# Patient Record
Sex: MALE | Race: WHITE | NOT HISPANIC OR LATINO | Employment: OTHER | URBAN - METROPOLITAN AREA
[De-identification: names, ages, dates, MRNs, and addresses within clinical notes are randomized per-mention and may not be internally consistent; named-entity substitution may affect disease eponyms.]

---

## 2017-07-21 PROBLEM — I20.9 ANGINA, CLASS II: Status: ACTIVE | Noted: 2017-07-21

## 2018-04-12 ENCOUNTER — HOSPITAL ENCOUNTER (INPATIENT)
Facility: HOSPITAL | Age: 83
LOS: 4 days | Discharge: NURSING, REHAB OR LONG-TERM CARE | DRG: 085 | End: 2018-04-17
Attending: EMERGENCY MEDICINE | Admitting: ANESTHESIOLOGY
Payer: MEDICARE

## 2018-04-12 DIAGNOSIS — I63.9 STROKE: ICD-10-CM

## 2018-04-12 DIAGNOSIS — I62.03 ACUTE ON CHRONIC INTRACRANIAL SUBDURAL HEMATOMA: ICD-10-CM

## 2018-04-12 DIAGNOSIS — R41.0 DISORIENTATION: ICD-10-CM

## 2018-04-12 DIAGNOSIS — S06.5XAA SDH (SUBDURAL HEMATOMA): ICD-10-CM

## 2018-04-12 DIAGNOSIS — I62.01 ACUTE ON CHRONIC INTRACRANIAL SUBDURAL HEMATOMA: ICD-10-CM

## 2018-04-12 DIAGNOSIS — I62.00 SUBDURAL HEMORRHAGE: Primary | ICD-10-CM

## 2018-04-12 DIAGNOSIS — R00.1 BRADYCARDIA: ICD-10-CM

## 2018-04-12 LAB
BASOPHILS # BLD AUTO: 0.03 K/UL
BASOPHILS NFR BLD: 0.4 %
DIFFERENTIAL METHOD: ABNORMAL
EOSINOPHIL # BLD AUTO: 0.3 K/UL
EOSINOPHIL NFR BLD: 4.6 %
ERYTHROCYTE [DISTWIDTH] IN BLOOD BY AUTOMATED COUNT: 13.8 %
HCT VFR BLD AUTO: 38.5 %
HGB BLD-MCNC: 13.2 G/DL
IMM GRANULOCYTES # BLD AUTO: 0.02 K/UL
IMM GRANULOCYTES NFR BLD AUTO: 0.3 %
LYMPHOCYTES # BLD AUTO: 1.5 K/UL
LYMPHOCYTES NFR BLD: 20.8 %
MCH RBC QN AUTO: 34.6 PG
MCHC RBC AUTO-ENTMCNC: 34.3 G/DL
MCV RBC AUTO: 101 FL
MONOCYTES # BLD AUTO: 0.6 K/UL
MONOCYTES NFR BLD: 8.3 %
NEUTROPHILS # BLD AUTO: 4.8 K/UL
NEUTROPHILS NFR BLD: 65.6 %
NRBC BLD-RTO: 0 /100 WBC
PLATELET # BLD AUTO: 130 K/UL
PMV BLD AUTO: 11.7 FL
RBC # BLD AUTO: 3.82 M/UL
WBC # BLD AUTO: 7.36 K/UL

## 2018-04-12 PROCEDURE — 99291 CRITICAL CARE FIRST HOUR: CPT | Mod: 25

## 2018-04-12 PROCEDURE — 80048 BASIC METABOLIC PNL TOTAL CA: CPT

## 2018-04-12 PROCEDURE — 99291 CRITICAL CARE FIRST HOUR: CPT | Mod: ,,, | Performed by: PHYSICIAN ASSISTANT

## 2018-04-12 PROCEDURE — 86850 RBC ANTIBODY SCREEN: CPT

## 2018-04-12 PROCEDURE — 85025 COMPLETE CBC W/AUTO DIFF WBC: CPT

## 2018-04-12 RX ORDER — NICARDIPINE HYDROCHLORIDE 0.2 MG/ML
5 INJECTION INTRAVENOUS CONTINUOUS
Status: DISCONTINUED | OUTPATIENT
Start: 2018-04-13 | End: 2018-04-13

## 2018-04-13 PROBLEM — I62.01 ACUTE ON CHRONIC INTRACRANIAL SUBDURAL HEMATOMA: Status: ACTIVE | Noted: 2018-04-13

## 2018-04-13 PROBLEM — R19.7 DIARRHEA OF PRESUMED INFECTIOUS ORIGIN: Status: ACTIVE | Noted: 2018-04-13

## 2018-04-13 PROBLEM — I10 HTN (HYPERTENSION): Status: ACTIVE | Noted: 2018-04-13

## 2018-04-13 PROBLEM — R90.89 MIDLINE SHIFT OF BRAIN: Status: ACTIVE | Noted: 2018-04-13

## 2018-04-13 PROBLEM — I62.03 ACUTE ON CHRONIC INTRACRANIAL SUBDURAL HEMATOMA: Status: ACTIVE | Noted: 2018-04-13

## 2018-04-13 PROBLEM — I60.9 SAH (SUBARACHNOID HEMORRHAGE): Status: ACTIVE | Noted: 2018-04-13

## 2018-04-13 PROBLEM — S06.5XAA SUBDURAL HEMATOMA: Status: ACTIVE | Noted: 2018-04-13

## 2018-04-13 PROBLEM — E63.9 NUTRITION DISORDER: Status: ACTIVE | Noted: 2018-04-13

## 2018-04-13 LAB
ABO + RH BLD: NORMAL
ALBUMIN SERPL BCP-MCNC: 2.9 G/DL
ALP SERPL-CCNC: 111 U/L
ALT SERPL W/O P-5'-P-CCNC: 16 U/L
AMPHET+METHAMPHET UR QL: NEGATIVE
ANION GAP SERPL CALC-SCNC: 10 MMOL/L
ANION GAP SERPL CALC-SCNC: 10 MMOL/L
ANION GAP SERPL CALC-SCNC: 11 MMOL/L
ANION GAP SERPL CALC-SCNC: 9 MMOL/L
AORTIC VALVE STENOSIS: ABNORMAL
APTT BLDCRRT: 26 SEC
AST SERPL-CCNC: 28 U/L
AST SERPL-CCNC: 30 U/L
AST SERPL-CCNC: 30 U/L
BARBITURATES UR QL SCN>200 NG/ML: NEGATIVE
BASOPHILS # BLD AUTO: 0.03 K/UL
BASOPHILS # BLD AUTO: 0.03 K/UL
BASOPHILS NFR BLD: 0.4 %
BASOPHILS NFR BLD: 0.4 %
BENZODIAZ UR QL SCN>200 NG/ML: NEGATIVE
BILIRUB SERPL-MCNC: 0.9 MG/DL
BILIRUB SERPL-MCNC: 0.9 MG/DL
BILIRUB SERPL-MCNC: 1 MG/DL
BILIRUB UR QL STRIP: NEGATIVE
BLD GP AB SCN CELLS X3 SERPL QL: NORMAL
BUN SERPL-MCNC: 12 MG/DL
BZE UR QL SCN: NEGATIVE
CALCIUM SERPL-MCNC: 8.3 MG/DL
CALCIUM SERPL-MCNC: 8.4 MG/DL
CALCIUM SERPL-MCNC: 8.5 MG/DL
CALCIUM SERPL-MCNC: 8.5 MG/DL
CANNABINOIDS UR QL SCN: NEGATIVE
CHLORIDE SERPL-SCNC: 107 MMOL/L
CHOLEST SERPL-MCNC: 121 MG/DL
CHOLEST/HDLC SERPL: 3 {RATIO}
CK MB SERPL-MCNC: 1.2 NG/ML
CK MB SERPL-RTO: 3.3 %
CK SERPL-CCNC: 36 U/L
CLARITY UR REFRACT.AUTO: CLEAR
CO2 SERPL-SCNC: 20 MMOL/L
CO2 SERPL-SCNC: 21 MMOL/L
CO2 SERPL-SCNC: 21 MMOL/L
CO2 SERPL-SCNC: 24 MMOL/L
COLOR UR AUTO: YELLOW
CREAT SERPL-MCNC: 0.8 MG/DL
CREAT SERPL-MCNC: 0.8 MG/DL
CREAT SERPL-MCNC: 0.9 MG/DL
CREAT SERPL-MCNC: 0.9 MG/DL
CREAT UR-MCNC: 22 MG/DL
DIASTOLIC DYSFUNCTION: YES
DIFFERENTIAL METHOD: ABNORMAL
DIFFERENTIAL METHOD: ABNORMAL
EOSINOPHIL # BLD AUTO: 0.3 K/UL
EOSINOPHIL # BLD AUTO: 0.3 K/UL
EOSINOPHIL NFR BLD: 4.2 %
EOSINOPHIL NFR BLD: 4.2 %
ERYTHROCYTE [DISTWIDTH] IN BLOOD BY AUTOMATED COUNT: 13.7 %
ERYTHROCYTE [DISTWIDTH] IN BLOOD BY AUTOMATED COUNT: 13.7 %
EST. GFR  (AFRICAN AMERICAN): >60 ML/MIN/1.73 M^2
EST. GFR  (NON AFRICAN AMERICAN): >60 ML/MIN/1.73 M^2
ESTIMATED AVG GLUCOSE: 91 MG/DL
ESTIMATED PA SYSTOLIC PRESSURE: 47.61
GLUCOSE SERPL-MCNC: 79 MG/DL
GLUCOSE SERPL-MCNC: 79 MG/DL
GLUCOSE SERPL-MCNC: 80 MG/DL
GLUCOSE SERPL-MCNC: 80 MG/DL
GLUCOSE UR QL STRIP: NEGATIVE
HBA1C MFR BLD HPLC: 4.8 %
HCT VFR BLD AUTO: 39.2 %
HCT VFR BLD AUTO: 39.2 %
HDLC SERPL-MCNC: 41 MG/DL
HDLC SERPL: 33.9 %
HGB BLD-MCNC: 13.4 G/DL
HGB BLD-MCNC: 13.4 G/DL
HGB UR QL STRIP: NEGATIVE
IMM GRANULOCYTES # BLD AUTO: 0.04 K/UL
IMM GRANULOCYTES # BLD AUTO: 0.04 K/UL
IMM GRANULOCYTES NFR BLD AUTO: 0.5 %
IMM GRANULOCYTES NFR BLD AUTO: 0.5 %
INR PPP: 1.3
INR PPP: 1.3
INR PPP: 1.4
KETONES UR QL STRIP: NEGATIVE
LDLC SERPL CALC-MCNC: 64 MG/DL
LEUKOCYTE ESTERASE UR QL STRIP: NEGATIVE
LYMPHOCYTES # BLD AUTO: 1.4 K/UL
LYMPHOCYTES # BLD AUTO: 1.4 K/UL
LYMPHOCYTES NFR BLD: 18.1 %
LYMPHOCYTES NFR BLD: 18.1 %
MAGNESIUM SERPL-MCNC: 1.9 MG/DL
MAGNESIUM SERPL-MCNC: 2 MG/DL
MAGNESIUM SERPL-MCNC: 2 MG/DL
MAGNESIUM SERPL-MCNC: 2.3 MG/DL
MCH RBC QN AUTO: 33.9 PG
MCH RBC QN AUTO: 33.9 PG
MCHC RBC AUTO-ENTMCNC: 34.2 G/DL
MCHC RBC AUTO-ENTMCNC: 34.2 G/DL
MCV RBC AUTO: 99 FL
MCV RBC AUTO: 99 FL
METHADONE UR QL SCN>300 NG/ML: NEGATIVE
MITRAL VALVE MOBILITY: NORMAL
MITRAL VALVE REGURGITATION: ABNORMAL
MONOCYTES # BLD AUTO: 0.6 K/UL
MONOCYTES # BLD AUTO: 0.6 K/UL
MONOCYTES NFR BLD: 7.4 %
MONOCYTES NFR BLD: 7.4 %
NEUTROPHILS # BLD AUTO: 5.5 K/UL
NEUTROPHILS # BLD AUTO: 5.5 K/UL
NEUTROPHILS NFR BLD: 69.4 %
NEUTROPHILS NFR BLD: 69.4 %
NITRITE UR QL STRIP: NEGATIVE
NONHDLC SERPL-MCNC: 80 MG/DL
NRBC BLD-RTO: 0 /100 WBC
NRBC BLD-RTO: 0 /100 WBC
OPIATES UR QL SCN: NEGATIVE
PCP UR QL SCN>25 NG/ML: NEGATIVE
PH UR STRIP: 6 [PH] (ref 5–8)
PHOSPHATE SERPL-MCNC: 2.4 MG/DL
PHOSPHATE SERPL-MCNC: 2.6 MG/DL
PHOSPHATE SERPL-MCNC: 2.6 MG/DL
PHOSPHATE SERPL-MCNC: 3.6 MG/DL
PLATELET # BLD AUTO: 136 K/UL
PLATELET # BLD AUTO: 136 K/UL
PMV BLD AUTO: 11.3 FL
PMV BLD AUTO: 11.3 FL
POCT GLUCOSE: 106 MG/DL (ref 70–110)
POCT GLUCOSE: 83 MG/DL (ref 70–110)
POCT GLUCOSE: 84 MG/DL (ref 70–110)
POCT GLUCOSE: 88 MG/DL (ref 70–110)
POCT GLUCOSE: 92 MG/DL (ref 70–110)
POTASSIUM SERPL-SCNC: 3.6 MMOL/L
POTASSIUM SERPL-SCNC: 3.7 MMOL/L
POTASSIUM SERPL-SCNC: 3.7 MMOL/L
POTASSIUM SERPL-SCNC: 4 MMOL/L
POTASSIUM SERPL-SCNC: 4.1 MMOL/L
PROT SERPL-MCNC: 6.3 G/DL
PROT SERPL-MCNC: 6.4 G/DL
PROT SERPL-MCNC: 6.4 G/DL
PROT UR QL STRIP: NEGATIVE
PROTHROMBIN TIME: 13.3 SEC
PROTHROMBIN TIME: 13.3 SEC
PROTHROMBIN TIME: 13.6 SEC
RBC # BLD AUTO: 3.95 M/UL
RBC # BLD AUTO: 3.95 M/UL
RETIRED EF AND QEF - SEE NOTES: 40 (ref 55–65)
SODIUM SERPL-SCNC: 138 MMOL/L
SODIUM SERPL-SCNC: 140 MMOL/L
SP GR UR STRIP: 1 (ref 1–1.03)
TOXICOLOGY INFORMATION: ABNORMAL
TRICUSPID VALVE REGURGITATION: ABNORMAL
TRIGL SERPL-MCNC: 80 MG/DL
TROPONIN I SERPL DL<=0.01 NG/ML-MCNC: 0.02 NG/ML
TSH SERPL DL<=0.005 MIU/L-ACNC: 2.39 UIU/ML
URN SPEC COLLECT METH UR: NORMAL
UROBILINOGEN UR STRIP-ACNC: NEGATIVE EU/DL
WBC # BLD AUTO: 7.95 K/UL
WBC # BLD AUTO: 7.95 K/UL

## 2018-04-13 PROCEDURE — 84484 ASSAY OF TROPONIN QUANT: CPT

## 2018-04-13 PROCEDURE — 93010 ELECTROCARDIOGRAM REPORT: CPT | Mod: ,,, | Performed by: INTERNAL MEDICINE

## 2018-04-13 PROCEDURE — 93005 ELECTROCARDIOGRAM TRACING: CPT | Mod: 25

## 2018-04-13 PROCEDURE — 85730 THROMBOPLASTIN TIME PARTIAL: CPT

## 2018-04-13 PROCEDURE — 92610 EVALUATE SWALLOWING FUNCTION: CPT

## 2018-04-13 PROCEDURE — 20000000 HC ICU ROOM

## 2018-04-13 PROCEDURE — 84132 ASSAY OF SERUM POTASSIUM: CPT

## 2018-04-13 PROCEDURE — 85610 PROTHROMBIN TIME: CPT | Mod: 91

## 2018-04-13 PROCEDURE — 82553 CREATINE MB FRACTION: CPT

## 2018-04-13 PROCEDURE — 80061 LIPID PANEL: CPT

## 2018-04-13 PROCEDURE — 83036 HEMOGLOBIN GLYCOSYLATED A1C: CPT

## 2018-04-13 PROCEDURE — 82550 ASSAY OF CK (CPK): CPT

## 2018-04-13 PROCEDURE — 80307 DRUG TEST PRSMV CHEM ANLYZR: CPT

## 2018-04-13 PROCEDURE — 25000003 PHARM REV CODE 250: Performed by: NURSE PRACTITIONER

## 2018-04-13 PROCEDURE — 99223 1ST HOSP IP/OBS HIGH 75: CPT | Mod: GC,,, | Performed by: NEUROLOGICAL SURGERY

## 2018-04-13 PROCEDURE — 80053 COMPREHEN METABOLIC PANEL: CPT

## 2018-04-13 PROCEDURE — 83735 ASSAY OF MAGNESIUM: CPT | Mod: 91

## 2018-04-13 PROCEDURE — 36415 COLL VENOUS BLD VENIPUNCTURE: CPT

## 2018-04-13 PROCEDURE — 81003 URINALYSIS AUTO W/O SCOPE: CPT

## 2018-04-13 PROCEDURE — 80053 COMPREHEN METABOLIC PANEL: CPT | Mod: 91

## 2018-04-13 PROCEDURE — 25000003 PHARM REV CODE 250: Performed by: PSYCHIATRY & NEUROLOGY

## 2018-04-13 PROCEDURE — 93306 TTE W/DOPPLER COMPLETE: CPT

## 2018-04-13 PROCEDURE — 27000221 HC OXYGEN, UP TO 24 HOURS

## 2018-04-13 PROCEDURE — 83735 ASSAY OF MAGNESIUM: CPT

## 2018-04-13 PROCEDURE — 85025 COMPLETE CBC W/AUTO DIFF WBC: CPT

## 2018-04-13 PROCEDURE — 85610 PROTHROMBIN TIME: CPT

## 2018-04-13 PROCEDURE — 84100 ASSAY OF PHOSPHORUS: CPT | Mod: 91

## 2018-04-13 PROCEDURE — 93306 TTE W/DOPPLER COMPLETE: CPT | Mod: 26,,, | Performed by: INTERNAL MEDICINE

## 2018-04-13 PROCEDURE — 99223 1ST HOSP IP/OBS HIGH 75: CPT | Mod: ,,, | Performed by: NURSE PRACTITIONER

## 2018-04-13 PROCEDURE — 82962 GLUCOSE BLOOD TEST: CPT

## 2018-04-13 PROCEDURE — 94761 N-INVAS EAR/PLS OXIMETRY MLT: CPT

## 2018-04-13 PROCEDURE — 97165 OT EVAL LOW COMPLEX 30 MIN: CPT

## 2018-04-13 PROCEDURE — 63600175 PHARM REV CODE 636 W HCPCS: Performed by: PSYCHIATRY & NEUROLOGY

## 2018-04-13 PROCEDURE — 84100 ASSAY OF PHOSPHORUS: CPT

## 2018-04-13 PROCEDURE — 84443 ASSAY THYROID STIM HORMONE: CPT

## 2018-04-13 PROCEDURE — 97162 PT EVAL MOD COMPLEX 30 MIN: CPT

## 2018-04-13 RX ORDER — SODIUM,POTASSIUM PHOSPHATES 280-250MG
2 POWDER IN PACKET (EA) ORAL
Status: DISCONTINUED | OUTPATIENT
Start: 2018-04-13 | End: 2018-04-17

## 2018-04-13 RX ORDER — LANOLIN ALCOHOL/MO/W.PET/CERES
800 CREAM (GRAM) TOPICAL
Status: DISCONTINUED | OUTPATIENT
Start: 2018-04-13 | End: 2018-04-17

## 2018-04-13 RX ORDER — PANTOPRAZOLE SODIUM 40 MG/1
40 TABLET, DELAYED RELEASE ORAL DAILY
Status: DISCONTINUED | OUTPATIENT
Start: 2018-04-13 | End: 2018-04-13

## 2018-04-13 RX ORDER — NITROGLYCERIN 0.4 MG/1
0.4 TABLET SUBLINGUAL EVERY 5 MIN PRN
Status: DISCONTINUED | OUTPATIENT
Start: 2018-04-13 | End: 2018-04-17 | Stop reason: HOSPADM

## 2018-04-13 RX ORDER — ACETAMINOPHEN 325 MG/1
650 TABLET ORAL EVERY 6 HOURS PRN
Status: DISCONTINUED | OUTPATIENT
Start: 2018-04-13 | End: 2018-04-17 | Stop reason: HOSPADM

## 2018-04-13 RX ORDER — LABETALOL HYDROCHLORIDE 5 MG/ML
10 INJECTION, SOLUTION INTRAVENOUS EVERY 4 HOURS PRN
Status: DISCONTINUED | OUTPATIENT
Start: 2018-04-13 | End: 2018-04-13

## 2018-04-13 RX ORDER — LABETALOL HYDROCHLORIDE 5 MG/ML
10 INJECTION, SOLUTION INTRAVENOUS EVERY 30 MIN PRN
Status: DISCONTINUED | OUTPATIENT
Start: 2018-04-13 | End: 2018-04-13

## 2018-04-13 RX ORDER — ATORVASTATIN CALCIUM 20 MG/1
20 TABLET, FILM COATED ORAL DAILY
Status: DISCONTINUED | OUTPATIENT
Start: 2018-04-13 | End: 2018-04-14

## 2018-04-13 RX ORDER — MAGNESIUM SULFATE HEPTAHYDRATE 40 MG/ML
2 INJECTION, SOLUTION INTRAVENOUS ONCE
Status: COMPLETED | OUTPATIENT
Start: 2018-04-13 | End: 2018-04-13

## 2018-04-13 RX ORDER — RANOLAZINE 500 MG/1
1000 TABLET, EXTENDED RELEASE ORAL 2 TIMES DAILY
Status: DISCONTINUED | OUTPATIENT
Start: 2018-04-13 | End: 2018-04-17 | Stop reason: HOSPADM

## 2018-04-13 RX ORDER — POTASSIUM CHLORIDE 20 MEQ/15ML
60 SOLUTION ORAL
Status: DISCONTINUED | OUTPATIENT
Start: 2018-04-13 | End: 2018-04-17

## 2018-04-13 RX ORDER — POTASSIUM CHLORIDE 20 MEQ/15ML
40 SOLUTION ORAL
Status: DISCONTINUED | OUTPATIENT
Start: 2018-04-13 | End: 2018-04-17

## 2018-04-13 RX ORDER — AZITHROMYCIN 250 MG/1
500 TABLET, FILM COATED ORAL DAILY
Status: COMPLETED | OUTPATIENT
Start: 2018-04-13 | End: 2018-04-15

## 2018-04-13 RX ORDER — ISOSORBIDE MONONITRATE 30 MG/1
60 TABLET, EXTENDED RELEASE ORAL DAILY
Status: DISCONTINUED | OUTPATIENT
Start: 2018-04-13 | End: 2018-04-14

## 2018-04-13 RX ORDER — LABETALOL HYDROCHLORIDE 5 MG/ML
10 INJECTION, SOLUTION INTRAVENOUS EVERY 4 HOURS PRN
Status: DISCONTINUED | OUTPATIENT
Start: 2018-04-13 | End: 2018-04-14

## 2018-04-13 RX ORDER — SODIUM CHLORIDE 9 MG/ML
INJECTION, SOLUTION INTRAVENOUS CONTINUOUS
Status: DISCONTINUED | OUTPATIENT
Start: 2018-04-13 | End: 2018-04-13

## 2018-04-13 RX ADMIN — SODIUM CHLORIDE: 0.9 INJECTION, SOLUTION INTRAVENOUS at 04:04

## 2018-04-13 RX ADMIN — POTASSIUM CHLORIDE 40 MEQ: 20 SOLUTION ORAL at 05:04

## 2018-04-13 RX ADMIN — ISOSORBIDE MONONITRATE 60 MG: 30 TABLET, EXTENDED RELEASE ORAL at 09:04

## 2018-04-13 RX ADMIN — RANOLAZINE 1000 MG: 1000 TABLET, FILM COATED, EXTENDED RELEASE ORAL at 10:04

## 2018-04-13 RX ADMIN — RANOLAZINE 1000 MG: 1000 TABLET, FILM COATED, EXTENDED RELEASE ORAL at 08:04

## 2018-04-13 RX ADMIN — PANTOPRAZOLE SODIUM 40 MG: 40 TABLET, DELAYED RELEASE ORAL at 09:04

## 2018-04-13 RX ADMIN — ATORVASTATIN CALCIUM 20 MG: 20 TABLET, FILM COATED ORAL at 09:04

## 2018-04-13 RX ADMIN — POTASSIUM & SODIUM PHOSPHATES POWDER PACK 280-160-250 MG 2 PACKET: 280-160-250 PACK at 05:04

## 2018-04-13 RX ADMIN — POTASSIUM & SODIUM PHOSPHATES POWDER PACK 280-160-250 MG 2 PACKET: 280-160-250 PACK at 09:04

## 2018-04-13 RX ADMIN — AZITHROMYCIN 500 MG: 250 TABLET, FILM COATED ORAL at 09:04

## 2018-04-13 RX ADMIN — MAGNESIUM SULFATE IN WATER 2 G: 40 INJECTION, SOLUTION INTRAVENOUS at 09:04

## 2018-04-13 RX ADMIN — PHYTONADIONE 10 MG: 10 INJECTION, EMULSION INTRAMUSCULAR; INTRAVENOUS; SUBCUTANEOUS at 09:04

## 2018-04-13 NOTE — ASSESSMENT & PLAN NOTE
85M with history of CAD, previously on ASA/Plavix which has been held, that presents with mild aphasia and confusion for past week s/p fall with small SDH 2 weeks ago. Repeat CT showing new SDH over prior chronic collection.     -No acute neurosurgical intervention  -recommend admission to [facility] for close monitoring and q1h neuro checks given recent worsening of SDH  -continue to hold antiplatelet medications  -SBP <160  -HOB30  -f/u repeat CT

## 2018-04-13 NOTE — PT/OT/SLP EVAL
Physical Therapy Evaluation    Patient Name:  Gurvinder Mckeon   MRN:  [id number]    Recommendations:     Discharge Recommendations:  home health PT (with 24/7 assistance ); pt and spouse would benefit from increased assistance/supervision at home 2/2 safety concerns   Discharge Equipment Recommendations: wheelchair   Barriers to discharge: Inaccessible home, decreased caregiver support     Assessment:     Gurvinder Mckeon is a 85 y.o. male admitted with a medical diagnosis of Acute on chronic intracranial subdural hematoma.  He presents with the following impairments/functional limitations:  weakness, impaired endurance, impaired functional mobilty, gait instability, impaired self care skills, impaired balance, decreased lower extremity function, decreased safety awareness, impaired cardiopulmonary response to activity, edema. Safety concerns noted for pt's home environment 2/2 pt's elderly wife serving as primary caretaker and unable to physical assist pt. Pt and spouse would benefit from additional assistance within home 2/2 fall risk, decreased safety awareness, and assistance with maintaining home; would appreciate CM recommendations on available resources. Pt would benefit from skilled PT intervention to address below listed deficits, reduce fall risk, and maximize (I) and safety with functional mobility.     Rehab Prognosis:  good; patient would benefit from acute skilled PT services to address these deficits and reach maximum level of function.      Recent Surgery: * No surgery found *      Plan:     During this hospitalization, patient to be seen 4 x/week to address the above listed problems via gait training, therapeutic activities, therapeutic exercises, neuromuscular re-education  · Plan of Care Expires:  05/13/18   Plan of Care Reviewed with: patient, spouse    Subjective     Communicated with RN prior to session.  Patient found supine with wife at bedside upon PT entry to room, agreeable to  evaluation.      Chief Complaint: decreased mobility   Patient comments/goals: return home   Pain/Comfort:  · Pain Rating 1: 0/10  · Pain Rating Post-Intervention 1: 0/10    Patients cultural, spiritual, Pentecostalism conflicts given the current situation: no known conflicts    Patient History:     Living Environment: Pt lives with wife in [facility] with 3 SUPRIYA, L HR available; bathroom has tub/shower.   Prior Level of Function: ambulating with 2 canes per wife, with supervision; performs ADLs with assistance from wife as needed   DME owned: rollator, RW, SPC, BSC   Caregiver Assistance: assistance from wife as needed (pt's wife is elderly, with concerns noted for safety awareness)     Objective:     Patient found with: telemetry, pulse ox (continuous), oxygen, peripheral IV, blood pressure cuff, SCD     General Precautions: Standard, aspiration, fall   Orthopedic Precautions:N/A   Braces: N/A     Exams:  · Cognitive Exam:  Patient is oriented to Person, Place and Situation and follows 100% of simple commands   · Postural Exam:  Patient presented with the following abnormalities:    · -       Rounded shoulders  · -       Forward head  · Skin Integrity/Edema:      · -       Skin integrity: Visible skin intact  · -       Edema: Moderate BLEs  · RLE ROM: WFL  · RLE Strength: WFL  · LLE ROM: WFL  · LLE Strength: WFL    Functional Mobility:       Bed Mobility  · Rolling to right: CGA   · Supine to sit: CGA with use of handrail         Transfers · Sit <> Stand: minimum assistance from EOB with no AD, HHA provided        Gait  Pt performed marching in place with bilateral HHA, followed by ~4 steps from EOB>chair with min-mod A (bilateral HHA), no AD.          AM-PAC 6 CLICK MOBILITY  Total Score:17       Therapeutic Activities and Exercises:  Pt and wife educated on:  - role of PT and POC/goals for therapy   - safety with mobility     Pt verbalized understanding. Pt expressed no further concerns/questions.       Patient left up in  chair with all lines intact, call button in reach, RN notified and wife present.    GOALS:    Physical Therapy Goals        Problem: Physical Therapy Goal    Goal Priority Disciplines Outcome Goal Variances Interventions   Physical Therapy Goal     PT/OT, PT Ongoing (interventions implemented as appropriate)     Description:  Goals to be met by: 18    Patient will increase functional independence with mobility by performin. Supine to sit with supervision   2. Sit to supine with supervision   3. Sit to stand transfer with Supervision with appropriate AD.   4. Gait  x 75 feet with Stand-by Assistance using Rolling Walker.   5. Ascend/descend 3 stairs with left Handrails Contact Guard Assistance.   6. Stand for 3 minutes with Stand-by Assistance using Rolling Walker  7. Lower extremity exercise program x20 reps per handout, with assistance as needed                      History:     Past Medical History:   Diagnosis Date    Anticoagulant long-term use     Anxiety     Arthritis     Bradycardia     CHF (congestive heart failure)     Coronary artery disease     Generalized headaches     Hemorrhoids     Hypercholesterolemia     Hypertension     Kidney stone     MI (myocardial infarction)     Prostate cancer     Skin cancer     UTI (urinary tract infection)     Weakness        Past Surgical History:   Procedure Laterality Date    CARDIAC CATHETERIZATION      WITH STENTS    CATARACT SX Bilateral     CHOLECYSTECTOMY      CORONARY STENT PLACEMENT      HEMORRHOID SURGERY      KNEE SX Right     FOR DAMAGED CARTILAGE       Clinical Decision Making:     Moderate complexity evaluation:   · Evolving clinical presentation   · 1-2 personal factors/comorbidities affecting treatment   · Examining and addressing 3+ functional deficits, activity limitations, and participation restrictions    Time Tracking:     PT Received On: 18  PT Start Time: 1022     PT Stop Time: 1046  PT Total Time (min): 24  min     Billable Minutes: Evaluation 24 min (Co-eval with OT)      Sasha Guan, PT, DPT   4/13/2018  Pager: [phone]

## 2018-04-13 NOTE — ASSESSMENT & PLAN NOTE
Contributing Nutrition Diagnosis  Inadequate energy intake    Related to (etiology):   Decreased ability to consume sufficient energy    Signs and Symptoms (as evidenced by):   Diet just advanced, no PO intake yet    Nutrition Diagnosis Status:   New

## 2018-04-13 NOTE — PLAN OF CARE
Problem: Patient Care Overview  Goal: Individualization & Mutuality  Dx: acute on chronic SDH (fall on 4/1 with R parietal vertex SDH, admitted 4/12 with additional L frontal parietal SDH)  Hx: MI s/p PCI, bradycardia, CAD, CHF, HTN, HLD    4/12: admitted to ED from [facility], CT head (additional SDH noted)  4/13: admitted to SICU     Nursing:  * q6h accuchecks  * SBP<160  * HOB 30-45 degrees   Outcome: Ongoing (interventions implemented as appropriate)  Remains oriented except needs reminder of year. All other neuro checks intact throughout shift.Blood pressure remains under 160 systolic. Continues with pvc's and artifact. Patient moves frequently in bed. 2l per nc. Patient using bedpan frequently with loose stools however beginning to form at this point. Urinal at bedside. Up to chair with PT/OT today. Tolerated cardiac diet. Denies pain. Patient remained free from falls. Care and plan discussed with patient and spouse. Iv fluids at 50ml/hr.

## 2018-04-13 NOTE — PT/OT/SLP EVAL
Speech Language Pathology Evaluation  Bedside Swallow    Patient Name:  Gurvinder Mckeon   MRN:  [id number]  Admitting Diagnosis: Acute on chronic intracranial subdural hematoma    Recommendations:                 General Recommendations:  Dysphagia therapy  Diet recommendations:  Regular, Thin   Aspiration Precautions: Standard aspiration precautions   General Precautions: Standard, aspiration  Communication strategies:pt Belk[place]ski    History:     Past Medical History:   Diagnosis Date    Anticoagulant long-term use     Anxiety     Arthritis     Bradycardia     CHF (congestive heart failure)     Coronary artery disease     Generalized headaches     Hemorrhoids     Hypercholesterolemia     Hypertension     Kidney stone     MI (myocardial infarction)     Prostate cancer     Skin cancer     UTI (urinary tract infection)     Weakness        Past Surgical History:   Procedure Laterality Date    CARDIAC CATHETERIZATION      WITH STENTS    CATARACT SX Bilateral     CHOLECYSTECTOMY      CORONARY STENT PLACEMENT      HEMORRHOID SURGERY      KNEE SX Right     FOR DAMAGED CARTILAGE       Social History: Patient lives with spouse.    Prior diet: Regular/thin.      Subjective     Awake/alert      Pain/Comfort:  · Pain Rating 1: 0/10  · Pain Rating Post-Intervention 1: 0/10    Objective:     Oral Musculature Evaluation  · Oral Musculature: WFL  · Mandibular Strength and Mobility: WFL  · Oral Labial Strength and Mobility: WFL  · Lingual Strength and Mobility: WFL  · Volitional Cough: adequate  · Volitional Swallow: timely   · Voice Prior to PO Intake: clear    Bedside Swallow Eval:   Consistencies Assessed:  · Thin liquids x8 cup/straw  · Puree x2  · Solids x1     Oral Phase:   · WFL    Pharyngeal Phase:   · timely swallow initiation  · no overt clinical signs/symptoms of aspiration    Compensatory Strategies  · None      Assessment:     Gurvinder Mckeon is a 85 y.o. male with an SLP diagnosis of  Dysphagia.   Goals:    SLP Goals        Problem: SLP Goal    Goal Priority Disciplines Outcome   SLP Goal     SLP    Description:  Speech Language Pathology Goals  Goals expected to be met by 4/19    1. Pt will tolerate regular diet/thin liquids without overt s/s of aspiration  2. Pt will participate in speech language cognitive eval                        Plan:     · Patient to be seen:  5 x/week   · Plan of Care reviewed with:  patient, spouse, daughter   · SLP Follow-Up:  Yes       Discharge recommendations:    TBD    Time Tracking:     SLP Treatment Date:   04/13/18  Speech Start Time:  0937  Speech Stop Time:  0946     Speech Total Time (min):  9 min    Billable Minutes: Eval Swallow and Oral Function 9    Mari Pool CCC-SLP  04/13/2018

## 2018-04-13 NOTE — ED TRIAGE NOTES
Pt states he fell during Easter and hit his head. Pt had a subdural hematoma. Pt had a follow up appointment today for his subdural hematoma and it was noted to have grown in size. Pt was transferred here from [facility]. Pt denies any symptoms except a pain posterior head, and pain in the back.

## 2018-04-13 NOTE — CONSULTS
"  [facility]  Adult Nutrition  Consult Note    SUMMARY     Recommendations  Recommendation/Intervention:   1. Encourage increased PO intake as tolerated.   2. If poor PO intake, recommend adding Boost Plus OS to all meals.   RD to monitor.    Goals: Patient to consume > 75% of meals  Nutrition Goal Status: new  Communication of RD Recs: reviewed with RN    Reason for Assessment  Reason for Assessment: consult  Diagnosis:  (acute on chronic intracranial SDH)  Relevant Medical History: CHF, CAD, HLD, HTN  General Information Comments: Patient transferred from [place]. Started on Cardiac diet this morning, no PO intake yet.  Nutrition Discharge Planning: Adequate nutrition via PO intake.    Nutrition/Diet History  Food Preferences: No cultural/Anabaptism needs identified at this time.  Factors Affecting Nutritional Intake:  (diet just advanced)    Anthropometrics  Temp: 97.6 °F (36.4 °C)  Height: 5' 6" (167.6 cm) (per chart review)  Height (inches): 66 in  Weight Method: Bed Scale  Weight: 81.1 kg (178 lb 12.7 oz)  Weight (lb): 178.79 lb  Ideal Body Weight (IBW), Male: 142 lb  % Ideal Body Weight, Male (lb): 125.91 lb  BMI (Calculated): 28.9  BMI Grade: 25 - 29.9 - overweight    Lab/Procedures/Meds  Pertinent Labs Reviewed: reviewed  Pertinent Labs Comments: Ca 8.4, Phos 2.4, Alb 2.9  Pertinent Medications Reviewed: reviewed  Pertinent Medications Comments: IVF    Physical Findings/Assessment  Overall Physical Appearance: on oxygen therapy  Oral/Mouth Cavity: WDL  Skin: edema    Estimated/Assessed Needs  Weight Used For Calorie Calculations: 81.1 kg (178 lb 12.7 oz)  Energy Calorie Requirements (kcal): 1799 kcal/day  Energy Need Method: [place] (x 1.25)  Protein Requirements: 81-98 g/day (1.0-1.2 g/kg)  Weight Used For Protein Calculations: 81.1 kg (178 lb 12.7 oz)  Fluid Requirements (mL): 1 mL/kcal or per MD  Fluid Need Method: RDA Method  RDA Method (mL): 1799    Nutrition Prescription " Ordered  Current Diet Order: Cardiac    Evaluation of Received Nutrient/Fluid Intake  I/O: Noted  Comments: LBM 4/13  % Intake of Estimated Energy Needs: 0 - 25 %  % Meal Intake: 0 - 25 % (diet just advanced)    Nutrition Risk  Level of Risk/Frequency of Follow-up: high (2x/week)     Assessment and Plan  * Acute on chronic intracranial subdural hematoma    Contributing Nutrition Diagnosis  Inadequate energy intake    Related to (etiology):   Decreased ability to consume sufficient energy    Signs and Symptoms (as evidenced by):   Diet just advanced, no PO intake yet    Nutrition Diagnosis Status:   New          Monitor and Evaluation  Food and Nutrient Intake: energy intake, food and beverage intake  Food and Nutrient Adminstration: diet order  Physical Activity and Function: nutrition-related ADLs and IADLs  Anthropometric Measurements: weight, weight change  Biochemical Data, Medical Tests and Procedures: electrolyte and renal panel, gastrointestinal profile, inflammatory profile  Nutrition-Focused Physical Findings: overall appearance     Nutrition Follow-Up  RD Follow-up?: Yes

## 2018-04-13 NOTE — PLAN OF CARE
Problem: Physical Therapy Goal  Goal: Physical Therapy Goal  Goals to be met by: 18    Patient will increase functional independence with mobility by performin. Supine to sit with supervision   2. Sit to supine with supervision   3. Sit to stand transfer with Supervision with appropriate AD.   4. Gait  x 75 feet with Stand-by Assistance using Rolling Walker.   5. Ascend/descend 3 stairs with left Handrails Contact Guard Assistance.   6. Stand for 3 minutes with Stand-by Assistance using Rolling Walker  7. Lower extremity exercise program x20 reps per handout, with assistance as needed    Outcome: Ongoing (interventions implemented as appropriate)  Pt chart reviewed and PT evaluation completed- see note for details. POC initiated.     Sasha Guan, PT, DPT   2018  Pager: [phone]

## 2018-04-13 NOTE — HPI
Mr. Mckeon is a 85 y.o. male with pmhx of TSDH (4/1/18), MI s/p PCI, Bradycardia, CAD, CHF, HTN, and HLD who presents to [facility] for a higher level of care for Acute on Chronic SDH. The patient initially presented to [facility] on 04/01/18 s/p fall and head injury. CT head on the same day revealed SDH in high right parietal vertex without mass effect. His plavix and ASA were discontinued. Today (04/12/18) he was sent for a repeat CT, by PCP, with a plan to restart plavix and ASA. The CT revealed new 2 cm left frontal parietal SDH with L to R mass effect of 7 mm. Pt denies new falls, change in LOC, vision changes, numbness/tingling, loss of strength, or gait changes.

## 2018-04-13 NOTE — ED NOTES
Per ANDREA Banuelos: OK to hold cardene, and start if pt's SBP >160. Pt SBP <160 at this time. Will continue to monitor.

## 2018-04-13 NOTE — HPI
Mr Mckeon is an 85M with history of CAD with stents, prior MI, CHF, HTN that presents as transfer for worsening chronic SDH. He had a fall 2 weeks ago and was found to have small L SDH that as stable on repeat scans at OSH. Today he went for CT to see if he could restart antiplatelet medications and was found to have new collection on top of prior chronic SDH/hygroma.

## 2018-04-13 NOTE — PLAN OF CARE
SW met with Pt and Pt wife at bedside. Discussed HH recs and provided list. Gave [facility] Brochure for PA support at home. They have a pa for this. Wife mentioned she will contact [facility] as well. They will review list and let SW know asap of choices.    SW advised RN of need for HH orders and choice if Pt dc over the weekend. On call case management can be contacted to set up the HH over the weekend.    Tonia Davis, HIGINIO  Neurocritical Care   [facility]  [id number]

## 2018-04-13 NOTE — NURSING TRANSFER
Nursing Transfer Note      4/13/2018     Transfer to SICU [id number] from ED    Transfer via stretcher    Transfer with no personal belongings present    Transported by MORENITA Ventura/ED    Medicines sent: n/a    Chart send with patient: no chart present    Notified: family at bedside, Vin with NCC notified    Patient reassessed at: 04/13/18 0345    Pt tolerated transfer well, VSS, in NAD. Placed on wall oxygen and telemetry monitoring.    Skin note: No breakdown noted to skin at this time.

## 2018-04-13 NOTE — H&P
[facility]  Neurocritical Care  History & Physical    Admit Date: 4/12/2018  Service Date: 04/13/2018  Length of Stay: 0    Subjective:     Chief Complaint: Acute on chronic intracranial subdural hematoma    History of Present Illness: Mr. Mckeon is a 85 y.o. male with pmhx of TSDH (4/1/18), MI s/p PCI, Bradycardia, CAD, CHF, HTN, and HLD who presents to [facility] for a higher level of care for Acute on Chronic SDH. The patient initially presented to [facility] on 04/01/18 s/p fall and head injury. CT head on the same day revealed SDH in high right parietal vertex without mass effect. His plavix and ASA were discontinued. Today (04/12/18) he was sent for a repeat CT, by PCP, with a plan to restart plavix and ASA. The CT revealed new 2 cm left frontal parietal SDH with L to R mass effect of 7 mm. Pt denies new falls, change in LOC, vision changes, numbness/tingling, loss of strength, or gait changes.               Past Medical History:   Diagnosis Date    Anticoagulant long-term use     Anxiety     Arthritis     Bradycardia     CHF (congestive heart failure)     Coronary artery disease     Generalized headaches     Hemorrhoids     Hypercholesterolemia     Hypertension     Kidney stone     MI (myocardial infarction)     Prostate cancer     Skin cancer     UTI (urinary tract infection)     Weakness      Past Surgical History:   Procedure Laterality Date    CARDIAC CATHETERIZATION      WITH STENTS    CATARACT SX Bilateral     CHOLECYSTECTOMY      CORONARY STENT PLACEMENT      HEMORRHOID SURGERY      KNEE SX Right     FOR DAMAGED CARTILAGE      No current facility-administered medications on file prior to encounter.      Current Outpatient Prescriptions on File Prior to Encounter   Medication Sig Dispense Refill    aspirin (ECOTRIN) 81 MG EC tablet Take 81 mg by mouth once daily.      atorvastatin (LIPITOR) 40 MG tablet Take 20 mg by mouth once daily.        clopidogrel (PLAVIX) 75 mg tablet Take 75 mg by mouth once daily.      esomeprazole magnesium (NEXIUM 24HR) 22.3 mg CpDR Take 22.3 mg by mouth once daily. OTC      furosemide (LASIX) 20 MG tablet Take 20 mg by mouth once daily at 6am.      isosorbide mononitrate (IMDUR) 60 MG 24 hr tablet Take 60 mg by mouth once daily.      lorazepam (ATIVAN) 0.5 MG tablet Take 0.5 mg by mouth 2 (two) times daily as needed.      nitroGLYCERIN (NITROSTAT) 0.4 MG SL tablet Place 0.4 mg under the tongue every 5 (five) minutes as needed.      ranolazine (RANEXA) 1,000 mg Tb12 Take 1,000 mg by mouth 2 (two) times daily.      sertraline (ZOLOFT) 25 MG tablet Take 50 mg by mouth once daily.         Allergies: Codeine and Cyclobenzaprine    Family History   Problem Relation Age of Onset    Heart disease Mother      Social History   Substance Use Topics    Smoking status: Never Smoker    Smokeless tobacco: Never Used    Alcohol use No     Review of Systems   Constitutional: Denies fevers or chills.  Pulmonary: Denies shortness of breath or cough.  Cardiology: Denies chest pain or palpitations.  GI: Denies abdominal pain or constipation.  Neurologic: Denies new weakness, headaches, or paresthesias.   Objective:     Vitals:    Temp: 98.2 °F (36.8 °C)  Pulse: 71  BP: (!) 145/70  MAP (mmHg): 106  Resp: 19  SpO2: 97 %  O2 Device (Oxygen Therapy): nasal cannula    Temp  Min: 98.2 °F (36.8 °C)  Max: 98.6 °F (37 °C)  Pulse  Min: 66  Max: 87  BP  Min: 109/64  Max: 167/99  MAP (mmHg)  Min: 82  Max: 117  Resp  Min: 13  Max: 23  SpO2  Min: 94 %  Max: 98 %    No intake/output data recorded.           Physical Exam  GA: Alert, comfortable, no acute distress.   HEENT: No scleral icterus or JVD. Decreased hearing  Pulmonary: Clear to auscultation A/L. No wheezing, crackles, or rhonchi.  Cardiac: RRR S1 & S2 w/o rubs/murmurs/gallops.   Abdominal: Bowel sounds present x 4. No appreciable hepatosplenomegaly.  Skin: No jaundice, rashes, or visible  lesions.  Neuro:  --GCS: E4 V4 M6  --Mental Status:  AAO x 2, following commands in all ext, weaker in the lower  --CN II-XII grossly intact.   --Pupils 3mm, PERRL.   --Corneal reflex, gag, cough intact.  --LUE strength: 5/5  --LLE strength: 4/5  --RUE strength: 5/5  --RLE strength: 4/5    Today I personally reviewed pertinent medications, lines/drains/airways, imaging, lab results, notably:        Assessment/Plan:     Neuro   * Acute on chronic intracranial subdural hematoma    -- Neurosurgery consulted  -- 04/13: CTH f/u reveals an acute on chronic subdural hematoma overlying the left cerebral convexity with stable right midline shift of 6 mm and mild right subfalcine herniation. Stable small area of right parietal subarachnoid hemorrhage. Probable remote infarcts in the left corona radiata and left basal ganglia  -- SBP goal < 160  -- PT/OT/Speech  -- Continue Neuro checks q 1hr  -- CXR pending        SAH (subarachnoid hemorrhage)    -- See Acute on chronic intracranial subdural hematoma        Midline shift of brain    -- See Acute on chronic intracranial subdural hematoma        Subdural hematoma    -- See Acute on chronic intracranial subdural hematoma        Cardiac/Vascular   HTN (hypertension)    -- Continue to monitor HR and BP   -- SBP goal < 160  -- 2D echo pending  -- Labetalol Prn        Endocrine   Nutrition disorder    -- Speech and nutrition consulted             Prophylaxis:  Venous Thromboembolism: mechanical  Stress Ulcer: None  Ventilator Pneumonia: no     Activity Orders          None        Full Code    Zion Phillips NP  Neurocritical Care  [facility]

## 2018-04-13 NOTE — SUBJECTIVE & OBJECTIVE
Past Medical History:   Diagnosis Date    Anticoagulant long-term use     Anxiety     Arthritis     Bradycardia     CHF (congestive heart failure)     Coronary artery disease     Generalized headaches     Hemorrhoids     Hypercholesterolemia     Hypertension     Kidney stone     MI (myocardial infarction)     Prostate cancer     Skin cancer     UTI (urinary tract infection)     Weakness      Past Surgical History:   Procedure Laterality Date    CARDIAC CATHETERIZATION      WITH STENTS    CATARACT SX Bilateral     CHOLECYSTECTOMY      CORONARY STENT PLACEMENT      HEMORRHOID SURGERY      KNEE SX Right     FOR DAMAGED CARTILAGE      No current facility-administered medications on file prior to encounter.      Current Outpatient Prescriptions on File Prior to Encounter   Medication Sig Dispense Refill    aspirin (ECOTRIN) 81 MG EC tablet Take 81 mg by mouth once daily.      atorvastatin (LIPITOR) 40 MG tablet Take 20 mg by mouth once daily.       clopidogrel (PLAVIX) 75 mg tablet Take 75 mg by mouth once daily.      esomeprazole magnesium (NEXIUM 24HR) 22.3 mg CpDR Take 22.3 mg by mouth once daily. OTC      furosemide (LASIX) 20 MG tablet Take 20 mg by mouth once daily at 6am.      isosorbide mononitrate (IMDUR) 60 MG 24 hr tablet Take 60 mg by mouth once daily.      lorazepam (ATIVAN) 0.5 MG tablet Take 0.5 mg by mouth 2 (two) times daily as needed.      nitroGLYCERIN (NITROSTAT) 0.4 MG SL tablet Place 0.4 mg under the tongue every 5 (five) minutes as needed.      ranolazine (RANEXA) 1,000 mg Tb12 Take 1,000 mg by mouth 2 (two) times daily.      sertraline (ZOLOFT) 25 MG tablet Take 50 mg by mouth once daily.         Allergies: Codeine and Cyclobenzaprine    Family History   Problem Relation Age of Onset    Heart disease Mother      Social History   Substance Use Topics    Smoking status: Never Smoker    Smokeless tobacco: Never Used    Alcohol use No     Review of Systems    Constitutional: Denies fevers or chills.  Pulmonary: Denies shortness of breath or cough.  Cardiology: Denies chest pain or palpitations.  GI: Denies abdominal pain or constipation.  Neurologic: Denies new weakness, headaches, or paresthesias.   Objective:     Vitals:    Temp: 98.2 °F (36.8 °C)  Pulse: 71  BP: (!) 145/70  MAP (mmHg): 106  Resp: 19  SpO2: 97 %  O2 Device (Oxygen Therapy): nasal cannula    Temp  Min: 98.2 °F (36.8 °C)  Max: 98.6 °F (37 °C)  Pulse  Min: 66  Max: 87  BP  Min: 109/64  Max: 167/99  MAP (mmHg)  Min: 82  Max: 117  Resp  Min: 13  Max: 23  SpO2  Min: 94 %  Max: 98 %    No intake/output data recorded.           Physical Exam  GA: Alert, comfortable, no acute distress.   HEENT: No scleral icterus or JVD. Decreased hearing  Pulmonary: Clear to auscultation A/L. No wheezing, crackles, or rhonchi.  Cardiac: RRR S1 & S2 w/o rubs/murmurs/gallops.   Abdominal: Bowel sounds present x 4. No appreciable hepatosplenomegaly.  Skin: No jaundice, rashes, or visible lesions.  Neuro:  --GCS: E4 V4 M6  --Mental Status:  AAO x 2, following commands in all ext, weaker in the lower  --CN II-XII grossly intact.   --Pupils 3mm, PERRL.   --Corneal reflex, gag, cough intact.  --LUE strength: 5/5  --LLE strength: 4/5  --RUE strength: 5/5  --RLE strength: 4/5    Today I personally reviewed pertinent medications, lines/drains/airways, imaging, lab results, notably:

## 2018-04-13 NOTE — ED NOTES
LOC: The patient is awake, alert, and oriented to place, time, situation. Affect is appropriate.  Speech is appropriate and clear.     APPEARANCE: Patient resting comfortably in no acute distress.  Patient is clean and well groomed.    SKIN: The skin is warm and dry; color consistent with ethnicity.  Patient has normal skin turgor and moist mucus membranes.  Skin intact; no breakdown or bruising noted.     MUSCULOSKELETAL: Patient moving upper and lower extremities without difficulty.  Pt complains of weakness in both legs.     RESPIRATORY: Airway is open and patent. Respirations spontaneous, even, easy, and non-labored.  Patient has a normal effort and rate.  No accessory muscle use noted. Denies cough. On 2 lpm via nasal cannula at 99% SpO2.     CARDIAC:  Normal rhythm and rate noted.  No peripheral edema noted. No complaints of chest pain.      ABDOMEN: Soft and non tender to palpation.  No distention noted.     NEUROLOGIC: Eyes open spontaneously.  Behavior appropriate to situation.  Follows commands; facial expression symmetrical.  Purposeful motor response noted; normal sensation in all extremities.

## 2018-04-13 NOTE — ED NOTES
Assumed care of patient after receiving report from MORENITA Denny. I acknowledge care for this patient. The patient is awake, alert, and cooperating with a calm affect. RR spontaneous, even, and unlabored, with regular effort and rate. Patient is in NAD, and resting calmly on stretcher with blood pressure cuff, pulse ox, and cardiac monitor attached. Bed locked in low position with side rails up x2 for safety, and call bell within reach. Patient is aware of POC, and has no complaints or concerns at this time. Will continue to monitor.

## 2018-04-13 NOTE — HOSPITAL COURSE
04/13: Admit to [facility], CTH  04/14: CT head 4/13: L convexity subacute SDH with no significant mass effect. Severe bradycardia. ECG: sinus jessie with intermittent EBBB and 1st degree AVB. Cardiooogy will be consulted.

## 2018-04-13 NOTE — ED PROVIDER NOTES
"Encounter Date: 4/12/2018    SCRIBE #1 NOTE: I, Astrid Ramirez, am scribing for, and in the presence of,  Dr. Ojeda. I have scribed the following portions of the note - the APC attestation.       History     Chief Complaint   Patient presents with    SDH Transfer - [facility]     Traumatic SDH following a fall on Easter which initially presented as hygroma, CT today shows left 2cm left frontal parietal SDH     Pt is a 86 y/o M with PMHx of traumatic SDH (4/1/18), CAD s/p PCI on plavix, MI, CHF, HTN, HLD, prostate CA, and skin CA who was transferred to [facility] from [facility] for evaluation of new SDH. Pt fell forward on 4/1/18 and hit his head on the doorframe, CT head on the same day revealed SDH in high right parietal vertex without mass effect. His plavix and ASA were discontinued. He was seen by Dr.Nancy Milton today for repeat CT to see if he could restart plavix and ASA; CT revealed new 2 cm left frontal parietal SDH with L to R mass effect of 7 mm. Pt's daughter reports his CT also showed signs of "3-4 old strokes" which the pt and his family were previously unaware of. Pt's daughter also says  noted he had a L sided facial droop today. In the ED, pt admits to constant posterior headache since he fell; he denies worsening of his headache over time. He also admits to chronic weakness in his legs. He denies falls since he has been home, LOC, chest pain, vision changes, N/V, SOB, numbness/tingling, loss of strength, gait changes. Pt's daughter and wife report the pt has seemed more confused than usual over the past 3-4 days and also admit to slurred speech.          Review of patient's allergies indicates:   Allergen Reactions    Codeine     Cyclobenzaprine      Past Medical History:   Diagnosis Date    Anticoagulant long-term use     Anxiety     Arthritis     Bradycardia     CHF (congestive heart failure)     Coronary artery disease     Generalized headaches     Hemorrhoids     " Hypercholesterolemia     Hypertension     Kidney stone     MI (myocardial infarction)     Prostate cancer     Skin cancer     UTI (urinary tract infection)     Weakness      Past Surgical History:   Procedure Laterality Date    CARDIAC CATHETERIZATION      WITH STENTS    CATARACT SX Bilateral     CHOLECYSTECTOMY      CORONARY STENT PLACEMENT      HEMORRHOID SURGERY      KNEE SX Right     FOR DAMAGED CARTILAGE     Family History   Problem Relation Age of Onset    Heart disease Mother      Social History   Substance Use Topics    Smoking status: Never Smoker    Smokeless tobacco: Never Used    Alcohol use No     Review of Systems   Constitutional: Negative for fever.   HENT: Negative for sore throat.    Respiratory: Negative for shortness of breath.    Cardiovascular: Negative for chest pain.   Gastrointestinal: Negative for nausea.   Genitourinary: Negative for dysuria.   Musculoskeletal: Negative for back pain.   Skin: Negative for rash.   Neurological: Positive for dizziness, facial asymmetry and headaches. Negative for weakness.   Hematological: Does not bruise/bleed easily.   Psychiatric/Behavioral: Positive for confusion.       Physical Exam     Initial Vitals [04/12/18 2144]   BP Pulse Resp Temp SpO2   (!) 167/99 74 17 98.2 °F (36.8 °C) 98 %      MAP       121.67         Physical Exam    Constitutional: Vital signs are normal. He appears well-developed and well-nourished.   HENT:   Head: Normocephalic and atraumatic.   Eyes: Conjunctivae are normal.   Cardiovascular: Normal rate and regular rhythm.   Abdominal: Soft. Normal appearance and bowel sounds are normal.   Musculoskeletal: Normal range of motion.   Neurological: He is alert.   Oriented to place only  No focal deficits on exam  PERRLA  EOMI     Skin: Skin is warm and intact.   Psychiatric: His speech is normal. Cognition and memory are normal.         ED Course   Critical Care  Date/Time: 4/13/2018 4:03 AM  Performed by: JUVENAL PAGE  JUANCARLOS  Authorized by: PHILLIP TOM   Direct patient critical care time: 20 minutes  Additional history critical care time: 5 minutes  Ordering / reviewing critical care time: 5 minutes  Documentation critical care time: 5 minutes  Consulting other physicians critical care time: 5 minutes  Consult with family critical care time: 5 minutes  Total critical care time (exclusive of procedural time) : 45 minutes  Critical care was necessary to treat or prevent imminent or life-threatening deterioration of the following conditions: CNS failure or compromise.        Labs Reviewed   CBC W/ AUTO DIFFERENTIAL - Abnormal; Notable for the following:        Result Value    RBC 3.82 (*)     Hemoglobin 13.2 (*)     Hematocrit 38.5 (*)      (*)     MCH 34.6 (*)     Platelets 130 (*)     All other components within normal limits   BASIC METABOLIC PANEL - Abnormal; Notable for the following:     CO2 20 (*)     Calcium 8.3 (*)     All other components within normal limits   DRUG SCREEN PANEL, URINE EMERGENCY   DRUG SCREEN PANEL, URINE EMERGENCY   TYPE & SCREEN   POCT GLUCOSE MONITORING CONTINUOUS             Medical Decision Making:   History:   Old Medical Records: I decided to obtain old medical records.  Clinical Tests:   Lab Tests: Ordered and Reviewed  ED Management:  85-year-old male with subdural hematoma causing midline shift.  There is an acute change in mental status although this is difficult to assess with his history of dementia  Plan: Repeat labs, contact neurosurgery and neuro critical care  Neuro critical care will admit the patient            Scribe Attestation:   Scribe #1: I performed the above scribed service and the documentation accurately describes the services I performed. I attest to the accuracy of the note.    Attending Attestation:     Physician Attestation Statement for NP/PA:   I discussed this assessment and plan of this patient with the NP/PA, but I did not personally examine the patient.  The face to face encounter was performed by the NP/PA.    Other NP/PA Attestation Additions:    History of Present Illness: 85 y.o.male arrived to the ED as a transfer for further evaluation and management of SDH. SDH noted on imaging today.            Physician Attestation for Scribe:      Comments: I, Dr. Shade Ojeda, personally performed the services described in this documentation. All medical record entries made by the scribe were at my direction and in my presence.  I have reviewed the chart and agree that the record reflects my personal performance and is accurate and complete. Shade Ojeda MD.  11:47 PM 04/12/2018                 Clinical Impression:   The primary encounter diagnosis was Subdural hemorrhage. Diagnoses of Disorientation, SDH (subdural hematoma), and Stroke were also pertinent to this visit.    Disposition:   Disposition: Admitted  Condition: Critical  Neuro icu                        Vin Smith PA-C  04/13/18 0403

## 2018-04-13 NOTE — PLAN OF CARE
Problem: Patient Care Overview  Goal: Plan of Care Review  Outcome: Ongoing (interventions implemented as appropriate)  Plan of care reviewed with patient, daughter-in-law, and spouse in reference to continuous IV fluid administration, administration of supplemental oxygen via nasal cannula, monitoring of daily labs, monitoring of serial blood glucose, monitoring of vital signs and intake/output, and comfort management. All questions and concerns were answered and addressed. All verbalized understanding.

## 2018-04-13 NOTE — NURSING
Continues to move and artifact present on monitor. All leads changed and patient encouraged to rest.

## 2018-04-13 NOTE — ASSESSMENT & PLAN NOTE
-- Neurosurgery consulted  -- 04/13: CTH f/u reveals an acute on chronic subdural hematoma overlying the left cerebral convexity with stable right midline shift of 6 mm and mild right subfalcine herniation. Stable small area of right parietal subarachnoid hemorrhage. Probable remote infarcts in the left corona radiata and left basal ganglia  -- SBP goal < 160  -- PT/OT/Speech  -- Continue Neuro checks q 1hr  -- CXR pending

## 2018-04-13 NOTE — SUBJECTIVE & OBJECTIVE
Interval History:    Pt had 3 episodes of loose stools.     Review of Systems      Objective:     Vitals:  Temp: 97.6 °F (36.4 °C)  Pulse: 79  Rhythm: normal sinus rhythm  BP: (!) 101/55  MAP (mmHg): 76  Resp: (!) 23  SpO2: (!) 91 %  O2 Device (Oxygen Therapy): nasal cannula    Temp  Min: 97.6 °F (36.4 °C)  Max: 98.6 °F (37 °C)  Pulse  Min: 64  Max: 101  BP  Min: 90/55  Max: 167/99  MAP (mmHg)  Min: 69  Max: 117  Resp  Min: 13  Max: 50  SpO2  Min: 91 %  Max: 99 %    04/12 0701 - 04/13 0700  In: 44 [I.V.:44]  Out: 75 [Urine:75]   Unmeasured Output  Urine Occurrence: 1  Stool Occurrence: 1       Physical Exam    Physical Exam  GA: Alert, comfortable, no acute distress.   HEENT: No scleral icterus or JVD. Decreased hearing  Pulmonary: Clear to auscultation A/L. No wheezing, crackles, or rhonchi.  Cardiac: RRR S1 & S2 w/o rubs/murmurs/gallops.   Abdominal: Bowel sounds present x 4. No appreciable hepatosplenomegaly.  Skin: No jaundice, rashes, or visible lesions.  Neuro:  --GCS: E4 V4 M6  --Mental Status:  AAO x 2, following commands in all ext, weaker in the lower  --CN II-XII grossly intact.   --Pupils 3mm, PERRL.   --Corneal reflex, gag, cough intact.  --LUE strength: 5/5  --LLE strength: 4/5  --RUE strength: 5/5  --RLE strength: 4/5      Medications:  Continuous  sodium chloride 0.9% Last Rate: 50 mL/hr at 04/13/18 0600   Scheduled  atorvastatin 20 mg Daily   azithromycin 500 mg Daily   isosorbide mononitrate 60 mg Daily   pantoprazole 40 mg Daily   ranolazine 1,000 mg BID   PRN  acetaminophen 650 mg Q6H PRN   labetalol 10 mg Q4H PRN   magnesium oxide 800 mg PRN   magnesium oxide 800 mg PRN   nitroGLYCERIN 0.4 mg Q5 Min PRN   potassium chloride 10% 40 mEq PRN   potassium chloride 10% 40 mEq PRN   potassium chloride 10% 60 mEq PRN   potassium, sodium phosphates 2 packet PRN   potassium, sodium phosphates 2 packet PRN   potassium, sodium phosphates 2 packet PRN     Today I personally reviewed pertinent medications,  lines/drains/airways, imaging, cardiology, lab results, microbiology results, notably:    Diet  Diet Cardiac  Diet Cardiac

## 2018-04-13 NOTE — PT/OT/SLP EVAL
Occupational Therapy   Evaluation    Name: Gurvinder Mckeon  MRN: [id number]  Admitting Diagnosis:  Acute on chronic intracranial subdural hematoma      Recommendations:     Discharge Recommendations: home health OT; pt appears near baseline, but pt/spouse would benefit from increased SPV/assist at home; would appreciate CM recommendations on available resources   Discharge Equipment Recommendations:  wheelchair  Barriers to discharge:  Inaccessible home environment, Decreased caregiver support    History:     Occupational Profile:  Living Environment: Pt lives with elderly spouse in 1SH with 3STE and LHR; has tub/shower (but prefers to sponge bathe)  Previous level of function: Mod (I) functional mobility/ADLs; pt/spouse both report that they suffer from age related decline in ability to tolerate physical activity and are both unable to cook anymore; they receive assistance via Meals on Wheels; pt does not drive but his spouse does occasionally  Equipment Owned:  rollator, walker, rolling, bedside commode, straight canes; pt reports using the rollator or two straight canes (at the same time) for mobility; he reports that the rollator does not fit through all of the doorways in his home and he does not like to use the standard RW  Assistance upon Discharge: pt/spouse have some assist from their children but not 24/7    Past Medical History:   Diagnosis Date    Anticoagulant long-term use     Anxiety     Arthritis     Bradycardia     CHF (congestive heart failure)     Coronary artery disease     Generalized headaches     Hemorrhoids     Hypercholesterolemia     Hypertension     Kidney stone     MI (myocardial infarction)     Prostate cancer     Skin cancer     UTI (urinary tract infection)     Weakness        Past Surgical History:   Procedure Laterality Date    CARDIAC CATHETERIZATION      WITH STENTS    CATARACT SX Bilateral     CHOLECYSTECTOMY      CORONARY STENT PLACEMENT      HEMORRHOID  SURGERY      KNEE SX Right     FOR DAMAGED CARTILAGE       Subjective     Chief Complaint: none stated  Patient/Family stated goals: return home  Communicated with: RN prior to session.  Pain/Comfort:  · Pain Rating 1: 0/10  · Pain Rating Post-Intervention 1: 0/10   ·   Objective:     Patient found with: telemetry, pulse ox (continuous), oxygen, peripheral IV, blood pressure cuff, SCD    General Precautions: Standard, aspiration, fall   Orthopedic Precautions:N/A   Braces: N/A     Occupational Performance:    Bed Mobility:    · Patient completed Supine to Sit with contact guard assistance    Functional Mobility/Transfers:  · Patient completed Sit <> Stand Transfer with minimum assistance  with  bilateral hand held assist   · Functional Mobility: ~5 steps to bedside chair with Min A with bilateral hand held assist    Activities of Daily Living:  · Feeding:  independence      Cognitive/Visual Perceptual:  Cognitive/Psychosocial Skills:     -       Oriented to: Person, Place and Situation   -       Follows Commands/attention:Follows two-step commands  -       Communication: clear/fluent  -       Memory: No Deficits noted  -       Safety awareness/insight to disability: intact   -       Mood/Affect/Coping skills/emotional control: Appropriate to situation    Physical Exam:  Postural examination/scapula alignment:    -       Rounded shoulders  -       Forward head  Skin integrity: Visible skin intact  Edema:  Moderate BLE  Sensation:    -       Intact  light/touch BUEs  Upper Extremity Range of Motion:     -       Right Upper Extremity: WFL  -       Left Upper Extremity: WFL  Upper Extremity Strength:    -       Right Upper Extremity: WFL  -       Left Upper Extremity: WFL   Strength:    -       Right Upper Extremity: WFL  -       Left Upper Extremity: WFL  Fine Motor Coordination:    -       Intact  Gross motor coordination:   WFL    Patient left up in chair with all lines intact, call button in reach, RN notified  "and spouse present    [facility] 6 Click:  [facility] Total Score: 17    Treatment & Education:  Pt/spouse educated on OT role/POC  Education:    Assessment:     Gurvinder Mckeon is a 85 y.o. male with a medical diagnosis of Acute on chronic intracranial subdural hematoma.  Performance deficits affecting function are weakness, impaired endurance, impaired self care skills, impaired functional mobilty, gait instability, impaired balance, impaired cognition, decreased safety awareness.  Pt appears to be near his baseline but would benefit from continued skilled OT services to ensure that pt returns to OF prior to d/c home.  Pt would benefit from HHOT at d/c due to fall risk and above listed performance deficits.      Rehab Prognosis:  good; patient would benefit from acute skilled OT services to address these deficits and reach maximum level of function.         Clinical Decision Makin.  OT Low:  "Pt evaluation falls under low complexity for evaluation coding due to performance deficits noted in 1-3 areas as stated above and 0 co-morbities affecting current functional status. Data obtained from problem focused assessments. No modifications or assistance was required for completion of evaluation. Only brief occupational profile and history review completed."     Plan:     Patient to be seen 4 x/week to address the above listed problems via self-care/home management, therapeutic activities, therapeutic exercises, neuromuscular re-education  · Plan of Care Expires: 18  · Plan of Care Reviewed with: patient, spouse    This Plan of care has been discussed with the patient who was involved in its development and understands and is in agreement with the identified goals and treatment plan    GOALS:    Occupational Therapy Goals        Problem: Occupational Therapy Goal    Goal Priority Disciplines Outcome Interventions   Occupational Therapy Goal     OT, PT/OT Ongoing (interventions implemented as appropriate)  "   Description:  Goals to be met by: 14 days (4/26/18)    Patient will increase functional independence with ADLs by performing:    LE Dressing with Minimal Assistance.  Grooming while standing at sink including oral care, face washing, and hand washing with Supervision and one or fewer seated rest breaks.  Toileting from toilet with Supervision for hygiene and clothing management.   Toilet transfer to toilet with Supervision.  Functional mobility at household distance with Supervision using RW.                      Time Tracking:     OT Date of Treatment: 04/13/18  OT Start Time: 1021  OT Stop Time: 1045  OT Total Time (min): 24 min    Billable Minutes:Evaluation 24    EVERTON Apodaca  4/13/2018

## 2018-04-13 NOTE — CONSULTS
[facility]  Neurosurgery  Consult Note    Inpatient consult to Neurosurgery  Consult performed by: LESLIE SOUSA  Consult ordered by: PHILLIP TOM        Subjective:     Chief Complaint/Reason for Admission: SDH    History of Present Illness: Mr Mckeon is an 85M with history of CAD with stents, prior MI, CHF, HTN that presents as transfer for worsening chronic SDH. He had a fall 2 weeks ago and was found to have small L SDH that as stable on repeat scans at OSH. Today he went for CT to see if he could restart antiplatelet medications and was found to have new collection on top of prior chronic SDH/hygroma.       (Not in a hospital admission)    Review of patient's allergies indicates:   Allergen Reactions    Codeine     Cyclobenzaprine        Past Medical History:   Diagnosis Date    Anticoagulant long-term use     Anxiety     Arthritis     Bradycardia     CHF (congestive heart failure)     Coronary artery disease     Generalized headaches     Hemorrhoids     Hypercholesterolemia     Hypertension     Kidney stone     MI (myocardial infarction)     Prostate cancer     Skin cancer     UTI (urinary tract infection)     Weakness      Past Surgical History:   Procedure Laterality Date    CARDIAC CATHETERIZATION      WITH STENTS    CATARACT SX Bilateral     CHOLECYSTECTOMY      CORONARY STENT PLACEMENT      HEMORRHOID SURGERY      KNEE SX Right     FOR DAMAGED CARTILAGE     Family History     Problem Relation (Age of Onset)    Heart disease Mother        Social History Main Topics    Smoking status: Never Smoker    Smokeless tobacco: Never Used    Alcohol use No    Drug use: No    Sexual activity: Not on file     Review of Systems   Constitutional: Negative for activity change.   HENT: Negative for congestion and dental problem.    Eyes: Negative for discharge and itching.   Respiratory: Negative for apnea and chest tightness.    Cardiovascular:  Negative for chest pain and leg swelling.   Gastrointestinal: Negative for abdominal distention and abdominal pain.   Endocrine: Negative for cold intolerance and heat intolerance.   Genitourinary: Negative for difficulty urinating and dysuria.   Musculoskeletal: Negative for arthralgias and back pain.   Neurological: Negative for dizziness.   Hematological: Negative for adenopathy.   Psychiatric/Behavioral: Negative for agitation.     Objective:        There is no height or weight on file to calculate BMI.  Vital Signs (Most Recent):  Temp: 98.2 °F (36.8 °C) (04/12/18 2144)  Pulse: 71 (04/13/18 0138)  Resp: 19 (04/13/18 0138)  BP: (!) 145/70 (04/13/18 0138)  SpO2: 97 % (04/13/18 0138) Vital Signs (24h Range):  Temp:  [98.2 °F (36.8 °C)-98.6 °F (37 °C)] 98.2 °F (36.8 °C)  Pulse:  [66-87] 71  Resp:  [13-23] 19  SpO2:  [94 %-98 %] 97 %  BP: (109-167)/(61-99) 145/70                           Neurosurgery Physical Exam   -Alert and oriented x2, not oriented to year  -PERRL, EOMI, face symmetrical, tongue midline, facial sensation symmetric, shoulder shrug full strength and symmetric  -Motor: 5/5 throughout the upper and lower extremites bilaterally  -sensation intact to light touch throughout  -reflexes 2+ in patella and brachioradialis bilaterally  -coordination intact to finger to nose bilaterally      Significant Labs:    Recent Labs  Lab 04/12/18 1739 04/12/18  2325    79    138   K 4.0 4.0    107   CO2 22* 20*   BUN 15 12   CREATININE 0.90 0.8   CALCIUM 8.5 8.3*       Recent Labs  Lab 04/12/18 1739 04/12/18  2325   WBC 6.40 7.36   HGB 13.6* 13.2*   HCT 40.2 38.5*   * 130*       Recent Labs  Lab 04/12/18 1739   LABPT 15.8*   INR 1.3*   APTT 32.1     Microbiology Results (last 7 days)     ** No results found for the last 168 hours. **        All pertinent labs from the last 24 hours have been reviewed.    Significant Diagnostics:  CT: Ct Head Without Contrast    Result Date:  4/13/2018  Acute on chronic subdural hematoma overlying the left cerebral convexity with stable right midline shift of 6 mm and mild right subfalcine herniation. Stable small area of right parietal subarachnoid hemorrhage. Probable remote infarcts in the left corona radiata and left basal ganglia. Chronic microvascular ischemic disease and generalized cerebral volume loss. This report was flagged in Epic as abnormal. Electronically signed by resident: Joselito Richmond Date:    04/13/2018 Time:    01:43 Electronically signed by: Shawn De La Cruz MD Date:    04/13/2018 Time:    02:17    Ct Head Without Contrast    Result Date: 4/12/2018  1 no change in the small linear area of subarachnoid blood in the right 2.  There is an acute subdural hematoma superimposed on chronic subdural or hygroma measuring approximately 2 cm in its greatest thickness at the vertex.  There is midline shift from left-to-right of 7 mm.  This was called emergently to Leslie Milton to the time of dictation Critical report Electronically signed by: PAYAL MURRAY MD Date:     04/12/18 Time:    15:31     Assessment/Plan:     Subdural hematoma    85M with history of CAD, previously on ASA/Plavix which has been held, that presents with mild aphasia and confusion for past week s/p fall with small SDH 2 weeks ago. Repeat CT showing new SDH over prior chronic collection.     -No acute neurosurgical intervention  -recommend admission to [facility] for close monitoring and q1h neuro checks given recent worsening of SDH  -continue to hold antiplatelet medications  -SBP <160  -HOB30  -f/u repeat CT              Thank you for your consult. I will follow-up with patient. Please contact us if you have any additional questions.    Jeremiah Guy MD  Neurosurgery  [facility]

## 2018-04-13 NOTE — PLAN OF CARE
PCP- DR. KYM LAWTON     PT HAS A RIDE HOME AND FAMILY SUPPORT WITH WIFE. PT TRANSFERRED FROM [facility].     PHARMACY- [facility], [place] [address]       04/13/18 8772   Discharge Assessment   Assessment Type Discharge Planning Assessment   Confirmed/corrected address and phone number on facesheet? Yes   Assessment information obtained from? Patient   Expected Length of Stay (days) 3   Communicated expected length of stay with patient/caregiver yes   Prior to hospitilization cognitive status: Alert/Oriented   Prior to hospitalization functional status: Assistive Equipment   Current cognitive status: Alert/Oriented   Current Functional Status: Assistive Equipment   Lives With spouse   Able to Return to Prior Arrangements yes   Is patient able to care for self after discharge? Yes   Patient's perception of discharge disposition home or selfcare   Readmission Within The Last 30 Days no previous admission in last 30 days   Patient currently being followed by outpatient case management? No   Patient currently receives any other outside agency services? No   Equipment Currently Used at Home rollator;walker, rolling;bedside commode   Do you have any problems affording any of your prescribed medications? No   Is the patient taking medications as prescribed? yes   Does the patient have transportation home? Yes   Transportation Available car;family or friend will provide   Does the patient receive services at the [facility]? No   Discharge Plan A Home with family   Discharge Plan B Home Health;Home with family   Patient/Family In Agreement With Plan yes

## 2018-04-13 NOTE — PLAN OF CARE
Problem: Occupational Therapy Goal  Goal: Occupational Therapy Goal  Goals to be met by: 14 days (4/26/18)    Patient will increase functional independence with ADLs by performing:    LE Dressing with Minimal Assistance.  Grooming while standing at sink including oral care, face washing, and hand washing with Supervision and one or fewer seated rest breaks.  Toileting from toilet with Supervision for hygiene and clothing management.   Toilet transfer to toilet with Supervision.  Functional mobility at household distance with Supervision using RW.    Outcome: Ongoing (interventions implemented as appropriate)  OT goals set.  EVERTON Apodaca  4/13/2018

## 2018-04-13 NOTE — PROGRESS NOTES
[facility]  Neurocritical Care  Progress Note    Admit Date: 4/12/2018  Service Date: 04/13/2018  Length of Stay: 0    Subjective:     Chief Complaint: Acute on chronic intracranial subdural hematoma    History of Present Illness: Mr. Mckeon is a 85 y.o. male with pmhx of TSDH (4/1/18), MI s/p PCI, Bradycardia, CAD, CHF, HTN, and HLD who presents to [facility] for a higher level of care for Acute on Chronic SDH. The patient initially presented to [facility] on 04/01/18 s/p fall and head injury. CT head on the same day revealed SDH in high right parietal vertex without mass effect. His plavix and ASA were discontinued. Today (04/12/18) he was sent for a repeat CT, by PCP, with a plan to restart plavix and ASA. The CT revealed new 2 cm left frontal parietal SDH with L to R mass effect of 7 mm. Pt denies new falls, change in LOC, vision changes, numbness/tingling, loss of strength, or gait changes.               Hospital Course: 04/13: Admit to [facility], [facility]    Interval History:    Pt had 3 episodes of loose stools.     Review of Systems      Objective:     Vitals:  Temp: 97.6 °F (36.4 °C)  Pulse: 79  Rhythm: normal sinus rhythm  BP: (!) 101/55  MAP (mmHg): 76  Resp: (!) 23  SpO2: (!) 91 %  O2 Device (Oxygen Therapy): nasal cannula    Temp  Min: 97.6 °F (36.4 °C)  Max: 98.6 °F (37 °C)  Pulse  Min: 64  Max: 101  BP  Min: 90/55  Max: 167/99  MAP (mmHg)  Min: 69  Max: 117  Resp  Min: 13  Max: 50  SpO2  Min: 91 %  Max: 99 %    04/12 0701 - 04/13 0700  In: 44 [I.V.:44]  Out: 75 [Urine:75]   Unmeasured Output  Urine Occurrence: 1  Stool Occurrence: 1       Physical Exam    Physical Exam  GA: Alert, comfortable, no acute distress.   HEENT: No scleral icterus or JVD. Decreased hearing  Pulmonary: Clear to auscultation A/L. No wheezing, crackles, or rhonchi.  Cardiac: RRR S1 & S2 w/o rubs/murmurs/gallops.   Abdominal: Bowel sounds present x 4. No appreciable hepatosplenomegaly.  Skin: No jaundice,  rashes, or visible lesions.  Neuro:  --GCS: E4 V4 M6  --Mental Status:  AAO x 2, following commands in all ext, weaker in the lower  --CN II-XII grossly intact.   --Pupils 3mm, PERRL.   --Corneal reflex, gag, cough intact.  --LUE strength: 5/5  --LLE strength: 4/5  --RUE strength: 5/5  --RLE strength: 4/5      Medications:  Continuous  sodium chloride 0.9% Last Rate: 50 mL/hr at 04/13/18 0600   Scheduled  atorvastatin 20 mg Daily   azithromycin 500 mg Daily   isosorbide mononitrate 60 mg Daily   pantoprazole 40 mg Daily   ranolazine 1,000 mg BID   PRN  acetaminophen 650 mg Q6H PRN   labetalol 10 mg Q4H PRN   magnesium oxide 800 mg PRN   magnesium oxide 800 mg PRN   nitroGLYCERIN 0.4 mg Q5 Min PRN   potassium chloride 10% 40 mEq PRN   potassium chloride 10% 40 mEq PRN   potassium chloride 10% 60 mEq PRN   potassium, sodium phosphates 2 packet PRN   potassium, sodium phosphates 2 packet PRN   potassium, sodium phosphates 2 packet PRN     Today I personally reviewed pertinent medications, lines/drains/airways, imaging, cardiology, lab results, microbiology results, notably:    Diet  Diet Cardiac  Diet Cardiac        Assessment/Plan:     Neuro   * Acute on chronic intracranial subdural hematoma    -- Neurosurgery consulted  -- 04/13: CTH f/u reveals an acute on chronic subdural hematoma overlying the left cerebral convexity with stable right midline shift of 6 mm and mild right subfalcine herniation. Stable small area of right parietal subarachnoid hemorrhage. Probable remote infarcts in the left corona radiata and left basal ganglia  -- SBP goal < 160  -- PT/OT/Speech  -- Continue Neuro checks q 1hr        SAH (subarachnoid hemorrhage)    -- See Acute on chronic intracranial subdural hematoma        Midline shift of brain    -- See Acute on chronic intracranial subdural hematoma        Subdural hematoma    -- See Acute on chronic intracranial subdural hematoma        Cardiac/Vascular   HTN (hypertension)    -- Continue  to monitor HR and BP   -- SBP goal < 160  -- 2D echo pending  -- restarted home imdur        ID   Diarrhea of presumed infectious origin    - azithromycin 500mg x 3 days        Endocrine   Nutrition disorder    -- Speech and nutrition consulted             Prophylaxis:  Venous Thromboembolism: mechanical  Stress Ulcer: PPI  Ventilator Pneumonia: no     Activity Orders          Commode at bedside starting at 04/13 1040        Full Code    Em Lucio MD  Neurocritical Care  [facility]

## 2018-04-13 NOTE — PLAN OF CARE
Problem: Patient Care Overview  Goal: Plan of Care Review  Recommendations  Recommendation/Intervention:   1. Encourage increased PO intake as tolerated.   2. If poor PO intake, recommend adding Boost Plus OS to all meals.   RD to monitor.    Goals: Patient to consume > 75% of meals  Nutrition Goal Status: new    Full assessment completed, see RD Note 4/13/2018.

## 2018-04-13 NOTE — SUBJECTIVE & OBJECTIVE
(Not in a hospital admission)    Review of patient's allergies indicates:   Allergen Reactions    Codeine     Cyclobenzaprine        Past Medical History:   Diagnosis Date    Anticoagulant long-term use     Anxiety     Arthritis     Bradycardia     CHF (congestive heart failure)     Coronary artery disease     Generalized headaches     Hemorrhoids     Hypercholesterolemia     Hypertension     Kidney stone     MI (myocardial infarction)     Prostate cancer     Skin cancer     UTI (urinary tract infection)     Weakness      Past Surgical History:   Procedure Laterality Date    CARDIAC CATHETERIZATION      WITH STENTS    CATARACT SX Bilateral     CHOLECYSTECTOMY      CORONARY STENT PLACEMENT      HEMORRHOID SURGERY      KNEE SX Right     FOR DAMAGED CARTILAGE     Family History     Problem Relation (Age of Onset)    Heart disease Mother        Social History Main Topics    Smoking status: Never Smoker    Smokeless tobacco: Never Used    Alcohol use No    Drug use: No    Sexual activity: Not on file     Review of Systems   Constitutional: Negative for activity change.   HENT: Negative for congestion and dental problem.    Eyes: Negative for discharge and itching.   Respiratory: Negative for apnea and chest tightness.    Cardiovascular: Negative for chest pain and leg swelling.   Gastrointestinal: Negative for abdominal distention and abdominal pain.   Endocrine: Negative for cold intolerance and heat intolerance.   Genitourinary: Negative for difficulty urinating and dysuria.   Musculoskeletal: Negative for arthralgias and back pain.   Neurological: Negative for dizziness.   Hematological: Negative for adenopathy.   Psychiatric/Behavioral: Negative for agitation.     Objective:        There is no height or weight on file to calculate BMI.  Vital Signs (Most Recent):  Temp: 98.2 °F (36.8 °C) (04/12/18 2144)  Pulse: 71 (04/13/18 0138)  Resp: 19 (04/13/18 0138)  BP: (!) 145/70 (04/13/18  0138)  SpO2: 97 % (04/13/18 0138) Vital Signs (24h Range):  Temp:  [98.2 °F (36.8 °C)-98.6 °F (37 °C)] 98.2 °F (36.8 °C)  Pulse:  [66-87] 71  Resp:  [13-23] 19  SpO2:  [94 %-98 %] 97 %  BP: (109-167)/(61-99) 145/70                           Neurosurgery Physical Exam   -Alert and oriented x2, not oriented to year  -PERRL, EOMI, face symmetrical, tongue midline, facial sensation symmetric, shoulder shrug full strength and symmetric  -Motor: 5/5 throughout the upper and lower extremites bilaterally  -sensation intact to light touch throughout  -reflexes 2+ in patella and brachioradialis bilaterally  -coordination intact to finger to nose bilaterally      Significant Labs:    Recent Labs  Lab 04/12/18 1739 04/12/18  2325    79    138   K 4.0 4.0    107   CO2 22* 20*   BUN 15 12   CREATININE 0.90 0.8   CALCIUM 8.5 8.3*       Recent Labs  Lab 04/12/18 1739 04/12/18  2325   WBC 6.40 7.36   HGB 13.6* 13.2*   HCT 40.2 38.5*   * 130*       Recent Labs  Lab 04/12/18 1739   LABPT 15.8*   INR 1.3*   APTT 32.1     Microbiology Results (last 7 days)     ** No results found for the last 168 hours. **        All pertinent labs from the last 24 hours have been reviewed.    Significant Diagnostics:  CT: Ct Head Without Contrast    Result Date: 4/13/2018  Acute on chronic subdural hematoma overlying the left cerebral convexity with stable right midline shift of 6 mm and mild right subfalcine herniation. Stable small area of right parietal subarachnoid hemorrhage. Probable remote infarcts in the left corona radiata and left basal ganglia. Chronic microvascular ischemic disease and generalized cerebral volume loss. This report was flagged in Epic as abnormal. Electronically signed by resident: Joselito Richmond Date:    04/13/2018 Time:    01:43 Electronically signed by: Sahwn De La Cruz MD Date:    04/13/2018 Time:    02:17    Ct Head Without Contrast    Result Date: 4/12/2018  1 no change in the small linear  area of subarachnoid blood in the right 2.  There is an acute subdural hematoma superimposed on chronic subdural or hygroma measuring approximately 2 cm in its greatest thickness at the vertex.  There is midline shift from left-to-right of 7 mm.  This was called emergently to Leslie Milton to the time of dictation Critical report Electronically signed by: PAYAL MURRAY MD Date:     04/12/18 Time:    15:31

## 2018-04-13 NOTE — ASSESSMENT & PLAN NOTE
-- Neurosurgery consulted  -- 04/13: CTH f/u reveals an acute on chronic subdural hematoma overlying the left cerebral convexity with stable right midline shift of 6 mm and mild right subfalcine herniation. Stable small area of right parietal subarachnoid hemorrhage. Probable remote infarcts in the left corona radiata and left basal ganglia  -- SBP goal < 160  -- PT/OT/Speech  -- Continue Neuro checks q 1hr

## 2018-04-14 PROBLEM — E44.1 MILD PROTEIN-CALORIE MALNUTRITION: Status: ACTIVE | Noted: 2018-04-13

## 2018-04-14 PROBLEM — I25.10 CORONARY ARTERY DISEASE INVOLVING NATIVE CORONARY ARTERY OF NATIVE HEART WITHOUT ANGINA PECTORIS: Status: ACTIVE | Noted: 2018-04-14

## 2018-04-14 PROBLEM — R00.1 BRADYCARDIA: Status: ACTIVE | Noted: 2018-04-14

## 2018-04-14 PROBLEM — I62.00 SUBDURAL HEMORRHAGE: Status: ACTIVE | Noted: 2018-04-14

## 2018-04-14 PROBLEM — I50.42 CHRONIC COMBINED SYSTOLIC AND DIASTOLIC HEART FAILURE: Status: ACTIVE | Noted: 2018-04-14

## 2018-04-14 PROBLEM — R19.7 DIARRHEA OF PRESUMED INFECTIOUS ORIGIN: Status: RESOLVED | Noted: 2018-04-13 | Resolved: 2018-04-14

## 2018-04-14 PROBLEM — I20.9 ANGINA, CLASS II: Status: RESOLVED | Noted: 2017-07-21 | Resolved: 2018-04-14

## 2018-04-14 LAB
ALBUMIN SERPL BCP-MCNC: 2.8 G/DL
ALP SERPL-CCNC: 106 U/L
ALT SERPL W/O P-5'-P-CCNC: 16 U/L
ANION GAP SERPL CALC-SCNC: 7 MMOL/L
AST SERPL-CCNC: 25 U/L
BASOPHILS # BLD AUTO: 0.03 K/UL
BASOPHILS NFR BLD: 0.4 %
BILIRUB SERPL-MCNC: 0.8 MG/DL
BNP SERPL-MCNC: 198 PG/ML
BUN SERPL-MCNC: 11 MG/DL
CALCIUM SERPL-MCNC: 8 MG/DL
CHLORIDE SERPL-SCNC: 108 MMOL/L
CO2 SERPL-SCNC: 22 MMOL/L
CREAT SERPL-MCNC: 0.9 MG/DL
DIFFERENTIAL METHOD: ABNORMAL
EOSINOPHIL # BLD AUTO: 0.3 K/UL
EOSINOPHIL NFR BLD: 3.7 %
ERYTHROCYTE [DISTWIDTH] IN BLOOD BY AUTOMATED COUNT: 13.8 %
EST. GFR  (AFRICAN AMERICAN): >60 ML/MIN/1.73 M^2
EST. GFR  (NON AFRICAN AMERICAN): >60 ML/MIN/1.73 M^2
GLUCOSE SERPL-MCNC: 82 MG/DL
HCT VFR BLD AUTO: 37.1 %
HGB BLD-MCNC: 12.6 G/DL
IMM GRANULOCYTES # BLD AUTO: 0.03 K/UL
IMM GRANULOCYTES NFR BLD AUTO: 0.4 %
INR PPP: 1.2
LYMPHOCYTES # BLD AUTO: 1.3 K/UL
LYMPHOCYTES NFR BLD: 15.1 %
MAGNESIUM SERPL-MCNC: 2.1 MG/DL
MCH RBC QN AUTO: 34 PG
MCHC RBC AUTO-ENTMCNC: 34 G/DL
MCV RBC AUTO: 100 FL
MONOCYTES # BLD AUTO: 0.6 K/UL
MONOCYTES NFR BLD: 6.9 %
NEUTROPHILS # BLD AUTO: 6.2 K/UL
NEUTROPHILS NFR BLD: 73.5 %
NRBC BLD-RTO: 0 /100 WBC
PHOSPHATE SERPL-MCNC: 3.5 MG/DL
PLATELET # BLD AUTO: 136 K/UL
PMV BLD AUTO: 11.6 FL
POCT GLUCOSE: 102 MG/DL (ref 70–110)
POCT GLUCOSE: 79 MG/DL (ref 70–110)
POCT GLUCOSE: 88 MG/DL (ref 70–110)
POTASSIUM SERPL-SCNC: 4.1 MMOL/L
PROT SERPL-MCNC: 5.9 G/DL
PROTHROMBIN TIME: 12.1 SEC
RBC # BLD AUTO: 3.71 M/UL
SODIUM SERPL-SCNC: 137 MMOL/L
WBC # BLD AUTO: 8.37 K/UL

## 2018-04-14 PROCEDURE — 63600175 PHARM REV CODE 636 W HCPCS: Performed by: STUDENT IN AN ORGANIZED HEALTH CARE EDUCATION/TRAINING PROGRAM

## 2018-04-14 PROCEDURE — 80053 COMPREHEN METABOLIC PANEL: CPT

## 2018-04-14 PROCEDURE — 93010 ELECTROCARDIOGRAM REPORT: CPT | Mod: ,,, | Performed by: INTERNAL MEDICINE

## 2018-04-14 PROCEDURE — 85610 PROTHROMBIN TIME: CPT

## 2018-04-14 PROCEDURE — 97110 THERAPEUTIC EXERCISES: CPT

## 2018-04-14 PROCEDURE — 25000003 PHARM REV CODE 250: Performed by: PSYCHIATRY & NEUROLOGY

## 2018-04-14 PROCEDURE — 94761 N-INVAS EAR/PLS OXIMETRY MLT: CPT

## 2018-04-14 PROCEDURE — 25000003 PHARM REV CODE 250: Performed by: STUDENT IN AN ORGANIZED HEALTH CARE EDUCATION/TRAINING PROGRAM

## 2018-04-14 PROCEDURE — 84100 ASSAY OF PHOSPHORUS: CPT

## 2018-04-14 PROCEDURE — 85025 COMPLETE CBC W/AUTO DIFF WBC: CPT

## 2018-04-14 PROCEDURE — 99233 SBSQ HOSP IP/OBS HIGH 50: CPT | Mod: GC,,, | Performed by: PSYCHIATRY & NEUROLOGY

## 2018-04-14 PROCEDURE — 11000001 HC ACUTE MED/SURG PRIVATE ROOM

## 2018-04-14 PROCEDURE — 83735 ASSAY OF MAGNESIUM: CPT

## 2018-04-14 PROCEDURE — 83880 ASSAY OF NATRIURETIC PEPTIDE: CPT

## 2018-04-14 RX ORDER — ATROPINE SULFATE 0.1 MG/ML
INJECTION INTRAVENOUS
Status: DISPENSED
Start: 2018-04-14 | End: 2018-04-14

## 2018-04-14 RX ORDER — ATORVASTATIN CALCIUM 20 MG/1
40 TABLET, FILM COATED ORAL DAILY
Status: DISCONTINUED | OUTPATIENT
Start: 2018-04-15 | End: 2018-04-17 | Stop reason: HOSPADM

## 2018-04-14 RX ORDER — ISOSORBIDE MONONITRATE 30 MG/1
30 TABLET, EXTENDED RELEASE ORAL DAILY
Status: DISCONTINUED | OUTPATIENT
Start: 2018-04-15 | End: 2018-04-17 | Stop reason: HOSPADM

## 2018-04-14 RX ORDER — NAPROXEN SODIUM 220 MG/1
81 TABLET, FILM COATED ORAL DAILY
Status: DISCONTINUED | OUTPATIENT
Start: 2018-04-14 | End: 2018-04-17 | Stop reason: HOSPADM

## 2018-04-14 RX ORDER — CARVEDILOL 3.12 MG/1
3.12 TABLET ORAL 2 TIMES DAILY
Status: DISCONTINUED | OUTPATIENT
Start: 2018-04-14 | End: 2018-04-15

## 2018-04-14 RX ORDER — HEPARIN SODIUM 5000 [USP'U]/ML
5000 INJECTION, SOLUTION INTRAVENOUS; SUBCUTANEOUS EVERY 8 HOURS
Status: DISCONTINUED | OUTPATIENT
Start: 2018-04-14 | End: 2018-04-17 | Stop reason: HOSPADM

## 2018-04-14 RX ORDER — SENNOSIDES 8.6 MG/1
8.6 TABLET ORAL DAILY
Status: DISCONTINUED | OUTPATIENT
Start: 2018-04-14 | End: 2018-04-17 | Stop reason: HOSPADM

## 2018-04-14 RX ADMIN — CARVEDILOL 3.12 MG: 3.12 TABLET, FILM COATED ORAL at 08:04

## 2018-04-14 RX ADMIN — ASPIRIN 81 MG CHEWABLE TABLET 81 MG: 81 TABLET CHEWABLE at 04:04

## 2018-04-14 RX ADMIN — AZITHROMYCIN 500 MG: 250 TABLET, FILM COATED ORAL at 08:04

## 2018-04-14 RX ADMIN — HEPARIN SODIUM 5000 UNITS: 5000 INJECTION, SOLUTION INTRAVENOUS; SUBCUTANEOUS at 10:04

## 2018-04-14 RX ADMIN — ATORVASTATIN CALCIUM 20 MG: 20 TABLET, FILM COATED ORAL at 08:04

## 2018-04-14 RX ADMIN — HEPARIN SODIUM 5000 UNITS: 5000 INJECTION, SOLUTION INTRAVENOUS; SUBCUTANEOUS at 04:04

## 2018-04-14 RX ADMIN — ISOSORBIDE MONONITRATE 60 MG: 30 TABLET, EXTENDED RELEASE ORAL at 08:04

## 2018-04-14 RX ADMIN — RANOLAZINE 1000 MG: 1000 TABLET, FILM COATED, EXTENDED RELEASE ORAL at 09:04

## 2018-04-14 RX ADMIN — RANOLAZINE 1000 MG: 1000 TABLET, FILM COATED, EXTENDED RELEASE ORAL at 08:04

## 2018-04-14 NOTE — CONSULTS
[facility]  Cardiology  Consult Note    Patient Name: Gurvinder Mckeon  MRN: [id number]  Admission Date: 4/12/2018  Hospital Length of Stay: 1 days  Code Status: Full Code   Attending Provider: Naresh Devine MD   Consulting Provider: Judah Lantigua MD  Primary Care Physician: Santos Maddox MD  Principal Problem:Acute on chronic intracranial subdural hematoma    Patient information was obtained from patient, spouse/SO and past medical records.     Inpatient consult to Cardiology  Consult performed by: JUDAH LANTIGUA  Consult ordered by: HILDA KUMAR        Subjective:     Chief Complaint:  Bradycardia     HPI: 85 y.o. male with pmhx of TSDH (4/1/18), CAD s/p PCI approx 10ya, Bradycardia, CHF (EF 40-45%, +DD), HTN, and HLD who presents to [facility] NCC service for a higher level of care for Acute on Chronic SDH. The patient initially presented to [facility] on 04/01/18 s/p fall and head injury. CT head on the same day revealed SDH in high right parietal vertex without mass effect. His plavix and ASA were discontinued. On 04/12/18 he was sent for a repeat CT, by PCP, with a plan to restart plavix and ASA. The CT revealed new 2 cm left frontal parietal SDH with L to R mass effect of 7 mm. Pt denies new falls or neurologic symptoms.  This morning patient was bradycardic and so cardiology is consulted for further assistance.  Patient was in bed during event, no LH/dizziness/chest pain/nausea/vomiting.  Patient had just had a BM prior to event.     7/2017:  LAD: 20% ostial, 50% mid, patent within stent  LCx: prox and mid stents patent, 40% ostial OM1, 40% distal  RCA: prox 30%, oPDA 60%    4/13/18 ECHO  CONCLUSIONS     1 - Mildly to moderately depressed left ventricular systolic function (EF 40-45%).     2 - Eccentric hypertrophy.     3 - Moderate left atrial enlargement.     4 - Impaired LV relaxation, elevated LAP (grade 2 diastolic dysfunction).     5 - Mild aortic  stenosis, BEATRIZ = 1.36 cm2, AVAi = 0.71 cm2/m2, peak velocity = 2.62 m/s, mean gradient = 14 mmHg.     6 - Normal right ventricular systolic function .     7 - Pulmonary hypertension. The estimate      Past Medical History:   Diagnosis Date    Anticoagulant long-term use     Anxiety     Arthritis     Bradycardia     CHF (congestive heart failure)     Coronary artery disease     Generalized headaches     Hemorrhoids     Hypercholesterolemia     Hypertension     Kidney stone     MI (myocardial infarction)     Prostate cancer     Skin cancer     UTI (urinary tract infection)     Weakness        Past Surgical History:   Procedure Laterality Date    CARDIAC CATHETERIZATION      WITH STENTS    CATARACT SX Bilateral     CHOLECYSTECTOMY      CORONARY STENT PLACEMENT      HEMORRHOID SURGERY      KNEE SX Right     FOR DAMAGED CARTILAGE       Review of patient's allergies indicates:   Allergen Reactions    Codeine     Cyclobenzaprine        No current facility-administered medications on file prior to encounter.      Current Outpatient Prescriptions on File Prior to Encounter   Medication Sig    aspirin (ECOTRIN) 81 MG EC tablet Take 81 mg by mouth once daily.    atorvastatin (LIPITOR) 40 MG tablet Take 20 mg by mouth once daily.     clopidogrel (PLAVIX) 75 mg tablet Take 75 mg by mouth once daily.    furosemide (LASIX) 20 MG tablet Take 20 mg by mouth once daily at 6am.    isosorbide mononitrate (IMDUR) 60 MG 24 hr tablet Take 60 mg by mouth once daily.    lorazepam (ATIVAN) 0.5 MG tablet Take 0.5 mg by mouth 2 (two) times daily as needed.    nitroGLYCERIN (NITROSTAT) 0.4 MG SL tablet Place 0.4 mg under the tongue every 5 (five) minutes as needed.    ranolazine (RANEXA) 1,000 mg Tb12 Take 1,000 mg by mouth 2 (two) times daily.    sertraline (ZOLOFT) 25 MG tablet Take 50 mg by mouth once daily.      Family History     Problem Relation (Age of Onset)    Heart disease Mother        Social History  Main Topics    Smoking status: Never Smoker    Smokeless tobacco: Never Used    Alcohol use No    Drug use: No    Sexual activity: Not on file     Review of Systems   Constitution: Negative.   HENT: Negative.    Eyes: Negative.    Cardiovascular: Negative.    Respiratory: Negative.    Endocrine: Negative.    Skin: Negative.    Musculoskeletal: Negative.    Gastrointestinal: Negative.    Genitourinary: Negative.    Neurological: Negative.      Objective:     Vital Signs (Most Recent):  Temp: 98.5 °F (36.9 °C) (04/14/18 0700)  Pulse: 84 (04/14/18 1134)  Resp: (!) 53 (04/14/18 1134)  BP: 105/60 (04/14/18 1134)  SpO2: 96 % (04/14/18 1134) Vital Signs (24h Range):  Temp:  [97.6 °F (36.4 °C)-98.7 °F (37.1 °C)] 98.5 °F (36.9 °C)  Pulse:  [] 84  Resp:  [17-53] 53  SpO2:  [91 %-97 %] 96 %  BP: ()/() 105/60     Weight: 73.9 kg (162 lb 14.7 oz)  Body mass index is 26.3 kg/m².    SpO2: 96 %  O2 Device (Oxygen Therapy): nasal cannula w/ humidification      Intake/Output Summary (Last 24 hours) at 04/14/18 1246  Last data filed at 04/14/18 0700   Gross per 24 hour   Intake           866.13 ml   Output              525 ml   Net           341.13 ml       Lines/Drains/Airways     Peripheral Intravenous Line                 Peripheral IV - Single Lumen 04/12/18 1733 Left Forearm 1 day         Peripheral IV - Single Lumen 04/13/18 0349 Right Antecubital 1 day                Physical Exam   Constitutional: He is oriented to person, place, and time. He appears well-developed and well-nourished.   HENT:   Head: Normocephalic and atraumatic.   Eyes: Conjunctivae and EOM are normal. Pupils are equal, round, and reactive to light.   Neck: Normal range of motion. Neck supple. No JVD present.   Cardiovascular: Normal rate, regular rhythm and normal heart sounds.  Exam reveals no gallop and no friction rub.    No murmur heard.  Pulmonary/Chest: Effort normal and breath sounds normal. No respiratory distress. He has no  wheezes. He has no rales. He exhibits no tenderness.   Abdominal: Soft. Bowel sounds are normal. He exhibits no distension. There is no tenderness.   Musculoskeletal: Normal range of motion. He exhibits no edema or tenderness.   Neurological: He is alert and oriented to person, place, and time.   Skin: Skin is warm and dry. No erythema. No pallor.       Significant Labs:   Recent Lab Results       04/14/18  0603 04/14/18  0359 04/13/18  2342 04/13/18  1802 04/13/18  1745      Immature Granulocytes  0.4        Immature Grans (Abs)  0.03  Comment:  Mild elevation in immature granulocytes is non specific and   can be seen in a variety of conditions including stress response,   acute inflammation, trauma and pregnancy. Correlation with other   laboratory and clinical findings is essential.          Albumin  2.8(L)        Alkaline Phosphatase  106        ALT  16        Anion Gap  7(L)        AST  25        Baso #  0.03        Basophil%  0.4        Total Bilirubin  0.8  Comment:  For infants and newborns, interpretation of results should be based  on gestational age, weight and in agreement with clinical  observations.  Premature Infant recommended reference ranges:  Up to 24 hours.............<8.0 mg/dL  Up to 48 hours............<12.0 mg/dL  3-5 days..................<15.0 mg/dL  6-29 days.................<15.0 mg/dL          BUN, Bld  11        Calcium  8.0(L)        Chloride  108        CO2  22(L)        Creatinine  0.9        Differential Method  Automated        eGFR if   >60.0        eGFR if non   >60.0  Comment:  Calculation used to obtain the estimated glomerular filtration  rate (eGFR) is the CKD-EPI equation.           Eos #  0.3        Eosinophil%  3.7        Glucose  82        Gran # (ANC)  6.2        Gran%  73.5(H)        Hematocrit  37.1(L)        Hemoglobin  12.6(L)        Coumadin Monitoring INR  1.2  Comment:  Coumadin Therapy:  2.0 - 3.0 for INR for all indicators except  mechanical heart valves  and antiphospholipid syndromes which should use 2.5 - 3.5.          Lymph #  1.3        Lymph%  15.1(L)        Magnesium  2.1  2.3      MCH  34.0(H)        MCHC  34.0        MCV  100(H)        Mono #  0.6        Mono%  6.9        MPV  11.6        nRBC  0        Phosphorus  3.5  3.6      Platelets  136(L)        POCT Glucose 88  84  92     Potassium  4.1  4.1      Total Protein  5.9(L)        Protime  12.1        RBC  3.71(L)        RDW  13.8        Sodium  137        WBC  8.37                        Significant Imaging: CT scan: Acute on chronic subdural hematoma overlying the left cerebral convexity with stable right midline shift of 6 mm and mild right subfalcine herniation. and Echocardiogram:   2D echo with color flow doppler:   Results for orders placed or performed during the hospital encounter of 04/12/18   Echo doppler color flow   Result Value Ref Range    EF 40 (A) 55 - 65    Mitral Valve Regurgitation TRIVIAL     Diastolic Dysfunction Yes (A)     Aortic Valve Stenosis MILD (A)     Est. PA Systolic Pressure 47.61 (A)     Mitral Valve Mobility NORMAL     Tricuspid Valve Regurgitation TRIVIAL      Assessment and Plan:     Coronary artery disease involving native coronary artery of native heart without angina pectoris    Continue Baby asa   No need for plavix this far out from stents  Risk factor modification with atorvastatin 40mg po qhs, adequate BP control        Bradycardia    On review of tele, patient had one episode of jessie into 40s right after having BM  Likely vagal in the setting of a SDH with midline shift  No indication for any further cardiac intervention of workup  Cardiology will sign off  Please re-consult as needed and thank you for allowing us to be a part of Mr Mckeon's care        HFrEF  Compensated  Continue coreg and imdur  May start ace-I if tolerated prior to dc or as o/p with primary cardiologist  No diuretics at this time    VTE Risk Mitigation          Ordered     heparin (porcine) injection 5,000 Units  Every 8 hours     Route:  Subcutaneous        04/14/18 1144     Reason for no Mechanical VTE Prophylaxis  Once      04/13/18 0301     IP VTE HIGH RISK PATIENT  Once      04/13/18 0301          Thank you for your consult. I will sign off. Please contact us if you have any additional questions.    Judah Champagne MD  Cardiology   [facility]

## 2018-04-14 NOTE — PLAN OF CARE
Hospital Medicine Consult Service  Case discussed with Dr. Carolin Rowley from Neurocritical team. Patient to be accepted to hospital medicine service and hospital medicine to assume care of patient tomorrow morning at 7:00 am if patient is out of the Neuro ICU and on the floor. Patient to be placed on IMT list this evening and be reassigned to hospital medicine service for the am. Please see Neuro ICU stepdown note for full details on patient's hospital course.     86 y/o male with history of chronic combined systolic and diastolic heart failure, CAD with prior coronary stent > 10 years ago, essential HTN admitted to Neuro ICU on 4/13 with acute on chronic left SDH. Patient with initial fall and right sided SDH on 4/1 and ASA and Plavix held and on repeat scan as outpatient on 4/12 noted to have left sided SDH that appeared to be acute on chronic with mild midline shift so admitted to [facility] [place] for Neurosurgery evaluation. Neurosurgery evaluated and patient did not require surgical intervention. PT/OT and ST evaluated patient and patient cleared for regular diet with thin liquids and PT/OT recommending home with  PT/OT and 24 hours supervision. Cardiology was consulted in Neuro ICU for episode of bradycardia with HR 23  but Cardiology felt related to vasovagal as patient had just had BM and resolved and Cardiology signed off. Neurosurgery stated to hold Plavix and Cardiology stated he does not need Plavix as stent was > 10 years ago but recommended to restart Aspirin therapy and NGSY okay and patient back on ASA therapy. Patient should be ready for home discharge in next 1-2 days.   According to Neuro ICU team patient is awake and oriented and not confused and neurologically intact.     DOC FUNEZ MD  Attending Staff Physician   Hospital Medicine  Pager: [phone]  Spectralink: [phone]

## 2018-04-14 NOTE — SUBJECTIVE & OBJECTIVE
Interval History: NAEON.    Medications:  Continuous Infusions:  Scheduled Meds:   aspirin  81 mg Oral Daily    [START ON 4/15/2018] atorvastatin  40 mg Oral Daily    atropine        azithromycin  500 mg Oral Daily    carvedilol  3.125 mg Oral BID    heparin (porcine)  5,000 Units Subcutaneous Q8H    [START ON 4/15/2018] isosorbide mononitrate  30 mg Oral Daily    ranolazine  1,000 mg Oral BID    senna  8.6 mg Oral Daily     PRN Meds:acetaminophen, labetalol, magnesium oxide, magnesium oxide, nitroGLYCERIN, potassium chloride 10%, potassium chloride 10%, potassium chloride 10%, potassium, sodium phosphates, potassium, sodium phosphates, potassium, sodium phosphates     Review of Systems  Objective:     Weight: 73.9 kg (162 lb 14.7 oz)  Body mass index is 26.3 kg/m².  Vital Signs (Most Recent):  Temp: 98.5 °F (36.9 °C) (04/14/18 0700)  Pulse: 84 (04/14/18 1134)  Resp: (!) 53 (04/14/18 1134)  BP: 105/60 (04/14/18 1134)  SpO2: 96 % (04/14/18 1134) Vital Signs (24h Range):  Temp:  [97.6 °F (36.4 °C)-98.7 °F (37.1 °C)] 98.5 °F (36.9 °C)  Pulse:  [] 84  Resp:  [17-53] 53  SpO2:  [91 %-97 %] 96 %  BP: ()/() 105/60       Date 04/14/18 0700 - 04/15/18 0659   Shift 9546-5608 6255-8830 2215-4342 24 Hour Total   I  N  T  A  K  E   Shift Total  (mL/kg)       O  U  T  P  U  T   Urine  (mL/kg/hr) 200   200    Shift Total  (mL/kg) 200  (2.7)   200  (2.7)   Weight (kg) 73.9 73.9 73.9 73.9        Neurosurgery Physical Exam     AAOx2, mild confusion, PERRL, EOMI, FS, TM, 5/5 throughout, SILT.    Significant Labs:    Recent Labs  Lab 04/13/18  0317 04/13/18  0411 04/13/18  1802 04/14/18  0359   GLU 80  80 79  --  82     138 140  --  137   K 3.7  3.7 3.6 4.1 4.1     107 107  --  108   CO2 21*  21* 24  --  22*   BUN 12  12 12  --  11   CREATININE 0.9  0.9 0.8  --  0.9   CALCIUM 8.5*  8.5* 8.4*  --  8.0*   MG 2.0  2.0 1.9 2.3 2.1       Recent Labs  Lab 04/12/18  1425 04/13/18  0318  04/14/18  0359   WBC 7.36 7.95  7.95 8.37   HGB 13.2* 13.4*  13.4* 12.6*   HCT 38.5* 39.2*  39.2* 37.1*   * 136*  136* 136*       Recent Labs  Lab 04/12/18  1739 04/13/18  0317 04/13/18  0411 04/14/18  0359   LABPT 15.8*  --   --   --    INR 1.3* 1.3*  1.3* 1.4* 1.2   APTT 32.1  --  26.0  --      Microbiology Results (last 7 days)     ** No results found for the last 168 hours. **        All pertinent labs from the last 24 hours have been reviewed.    Significant Diagnostics:  I have reviewed and interpreted all pertinent imaging results/findings within the past 24 hours.

## 2018-04-14 NOTE — PLAN OF CARE
Problem: Patient Care Overview  Goal: Individualization & Mutuality  Dx: acute on chronic SDH (fall on 4/1 with R parietal vertex SDH, admitted 4/12 with additional L frontal parietal SDH)  Hx: MI s/p PCI, bradycardia, CAD, CHF, HTN, HLD    4/12: admitted to ED from [facility], CT head (additional SDH noted)  4/13: admitted to SICU     Nursing:  * q6h accuchecks  * SBP<160  * HOB 30-45 degrees   Outcome: Ongoing (interventions implemented as appropriate)  Alert. Remains on NC @ 2l. Continues with bigeminy and block. Other VSS. Up to chair for 2 hours. Several bm's however formed. 1 episode of bradycardia after bm and transfer to bed. md aware. Denies pain. Appetite fair. Urinal at bedside and bedpan as well. Spouse remains at bedside. Patient has transfer orders at this time.

## 2018-04-14 NOTE — ASSESSMENT & PLAN NOTE
85M with history of CAD, previously on ASA/Plavix which has been held, that presents with mild aphasia and confusion for past week s/p fall with small SDH 2 weeks ago. Repeat CT showing new SDH over prior chronic collection.     -No neurosurgical intervention  -Repeat CTH stable  -SBP <160  -HOB30  -Hold Plavix  -No further neurosurgical needs at this time, recommend step down to medicine service. Will sign off, please call with questions.

## 2018-04-14 NOTE — PLAN OF CARE
Problem: Patient Care Overview  Goal: Plan of Care Review  Outcome: Ongoing (interventions implemented as appropriate)  Plan of care reviewed with patient and family in reference to administration of continuous supplemental IV fluids, serial neurological assessment, administration of supplemental oxygen via nasal cannula, monitoring of vital signs and intake/output, serial blood glucose monitoring, monitoring of daily labs, and comfort management. All questions and concerns were answered and addressed. All verbalized understanding.

## 2018-04-14 NOTE — PLAN OF CARE
Neuro Critical Care Transfer of Care note    Date of Admit: 4/12/2018  Date of Transfer / Stepdown: 4/14/2018    Brief History of Present Illness:      Gurvinder Mckeon is a 85 y.o. male who  has a past medical history of Anticoagulant long-term use; Anxiety; Arthritis; Bradycardia; CHF (congestive heart failure); Coronary artery disease; Generalized headaches; Hemorrhoids; Hypercholesterolemia; Hypertension; Kidney stone; MI (myocardial infarction); Prostate cancer; Skin cancer; UTI (urinary tract infection); and Weakness.. The patient presented to [facility] on 4/12/2018 with a primary complaint of SDH Transfer - [place] (Traumatic SDH following a fall on Easter which initially presented as hygroma, CT today shows left 2cm left frontal parietal SDH)    Hospital Course By Problem with Pertinent Findings:     1. Acute on chronic intracranial subdural hematoma  -- Neurosurgery recs no neurosurgical intervention  -- Stable since admission   -- 04/13: CTH f/u stable right midline shift of 6 mm and mild right subfalcine herniation. Stable small area of right parietal subarachnoid hemorrhage  -- SBP goal < 160  -- PT/OT/Speech  -- Continue Neuro checks q 4hr  -- NSGY signed off / consult if needed     2. CAD  - Initiated low dose coreg during hospital stay for systolic dysfunction   - Lowered home IMDUR 60 to 30 for bradycardia / low BP  - Initated ASA / Shall consider plavix in 5-7 post SDH if needed - Unclear about plavix as home med / no cardiac stents   - Followed cardiology recommendations     3. HTN  - As above   - SBP < 160 target     4.  ?Pre-Syncope   - ? Etiology for fall  / Likely cardiac origin for bradycardia, bigeminy concerns    - Check ortho stats / continue cardiac arrhythmias monitoring - Follow-up Cardiology recs    Consultants and Procedures:     Consultants:  NSGY / Cardiology     Procedures:    NA    Transfer Information:     Diet:  Cardiac diet     Physical Activity:  PT / OT /  SLP     To Do / Pending Studies / Follow ups:  PT/OT/SLP     Carolin Rowley  Neuro Crtical Care

## 2018-04-14 NOTE — NURSING
Patient assisted back to bed. Monitors reconnected. Patient bradicardic to 40's. NCC currently rounding and aware. Patient asymptomatic otherwise. Cardiology consulted and new orders placed. EKG done and new orders. Patient returned to normal heart rate within a few minutes and continues with no other symptoms. Patient to stay in bed remainder of the day.

## 2018-04-14 NOTE — ASSESSMENT & PLAN NOTE
Baby asa when cleared by neuro  No need for plavix this far out from stents  Risk factor modification with atorvastatin 40mg po qhs, adequate BP control

## 2018-04-14 NOTE — SIGNIFICANT EVENT
Notified NCC provider on call of pt converting in and out of asymptomatic bigeminy. Provider instructed this RN to obtain BP via cuff when bigeminy was observed to ensure pressure was maintained. BP obtained; 124/79 recorded via cuff. Will continue to monitor pt.

## 2018-04-14 NOTE — PROGRESS NOTES
[facility]  Neurosurgery  Progress Note    Subjective:     History of Present Illness: Mr Mckeon is an 85M with history of CAD with stents, prior MI, CHF, HTN that presents as transfer for worsening chronic SDH. He had a fall 2 weeks ago and was found to have small L SDH that as stable on repeat scans at OSH. Today he went for CT to see if he could restart antiplatelet medications and was found to have new collection on top of prior chronic SDH/hygroma.     Post-Op Info:  * No surgery found *         Interval History: NAEON.    Medications:  Continuous Infusions:  Scheduled Meds:   aspirin  81 mg Oral Daily    [START ON 4/15/2018] atorvastatin  40 mg Oral Daily    atropine        azithromycin  500 mg Oral Daily    carvedilol  3.125 mg Oral BID    heparin (porcine)  5,000 Units Subcutaneous Q8H    [START ON 4/15/2018] isosorbide mononitrate  30 mg Oral Daily    ranolazine  1,000 mg Oral BID    senna  8.6 mg Oral Daily     PRN Meds:acetaminophen, labetalol, magnesium oxide, magnesium oxide, nitroGLYCERIN, potassium chloride 10%, potassium chloride 10%, potassium chloride 10%, potassium, sodium phosphates, potassium, sodium phosphates, potassium, sodium phosphates     Review of Systems  Objective:     Weight: 73.9 kg (162 lb 14.7 oz)  Body mass index is 26.3 kg/m².  Vital Signs (Most Recent):  Temp: 98.5 °F (36.9 °C) (04/14/18 0700)  Pulse: 84 (04/14/18 1134)  Resp: (!) 53 (04/14/18 1134)  BP: 105/60 (04/14/18 1134)  SpO2: 96 % (04/14/18 1134) Vital Signs (24h Range):  Temp:  [97.6 °F (36.4 °C)-98.7 °F (37.1 °C)] 98.5 °F (36.9 °C)  Pulse:  [] 84  Resp:  [17-53] 53  SpO2:  [91 %-97 %] 96 %  BP: ()/() 105/60       Date 04/14/18 0700 - 04/15/18 0659   Shift 1969-6870 6553-2695 4472-8287 24 Hour Total   I  N  T  A  K  E   Shift Total  (mL/kg)       O  U  T  P  U  T   Urine  (mL/kg/hr) 200   200    Shift Total  (mL/kg) 200  (2.7)   200  (2.7)   Weight (kg) 73.9 73.9 73.9  73.9        Neurosurgery Physical Exam     AAOx2, mild confusion, PERRL, EOMI, FS, TM, 5/5 throughout, SILT.    Significant Labs:    Recent Labs  Lab 04/13/18 0317 04/13/18  0411 04/13/18  1802 04/14/18  0359   GLU 80  80 79  --  82     138 140  --  137   K 3.7  3.7 3.6 4.1 4.1     107 107  --  108   CO2 21*  21* 24  --  22*   BUN 12  12 12  --  11   CREATININE 0.9  0.9 0.8  --  0.9   CALCIUM 8.5*  8.5* 8.4*  --  8.0*   MG 2.0  2.0 1.9 2.3 2.1       Recent Labs  Lab 04/12/18  2325 04/13/18 0317 04/14/18  0359   WBC 7.36 7.95  7.95 8.37   HGB 13.2* 13.4*  13.4* 12.6*   HCT 38.5* 39.2*  39.2* 37.1*   * 136*  136* 136*       Recent Labs  Lab 04/12/18  1739 04/13/18 0317 04/13/18  0411 04/14/18  0359   LABPT 15.8*  --   --   --    INR 1.3* 1.3*  1.3* 1.4* 1.2   APTT 32.1  --  26.0  --      Microbiology Results (last 7 days)     ** No results found for the last 168 hours. **        All pertinent labs from the last 24 hours have been reviewed.    Significant Diagnostics:  I have reviewed and interpreted all pertinent imaging results/findings within the past 24 hours.    Assessment/Plan:     Subdural hematoma    85M with history of CAD, previously on ASA/Plavix which has been held, that presents with mild aphasia and confusion for past week s/p fall with small SDH 2 weeks ago. Repeat CT showing new SDH over prior chronic collection.     -No neurosurgical intervention  -Repeat CTH stable  -SBP <160  -HOB30  -Hold Plavix  -No further neurosurgical needs at this time, recommend step down to medicine service. Will sign off, please call with questions.                 Samuel Lance MD  Neurosurgery  [facility]

## 2018-04-14 NOTE — PT/OT/SLP PROGRESS
"Occupational Therapy   Treatment    Name: Gurvinder Mckeon  MRN: [id number]  Admitting Diagnosis:  Acute on chronic intracranial subdural hematoma       Recommendations:     Discharge Recommendations: home with home health  Discharge Equipment Recommendations:  wheelchair  Barriers to discharge:  Inaccessible home environment, Decreased caregiver support    Subjective     Communicated with: RN prior to and after session.  Pain/Comfort:  · Pain Rating 1: 0/10  · Pain Rating Post-Intervention 1: 0/10    Patients cultural, spiritual, Voodoo conflicts given the current situation:      Objective:     Patient found with: telemetry, pulse ox (continuous), oxygen, peripheral IV, blood pressure cuff, SCD.  Pt sitting up in chair upon arrival; wife present.    General Precautions: Standard, aspiration, fall   Orthopedic Precautions:N/A   Braces: N/A     Occupational Performance:    Bed Mobility:    · Not assessed 2* pt up in chair upon arrival.    Functional Mobility/Transfers:  · Patient completed Sit <> Stand Transfer with minimum assistance  with  no assistive device x 1 trial from chair; pt stood for ~35 seconds.  · Functional Mobility: Pt did not take steps this date.    Activities of Daily Living:  · Toileting: total assistance for placing bed pan under pt while seated in chair.  OT expressed that she could assist pt with walking to bathroom with RN present.  Upon standing pt noted to be weak and stated, "I can't walk there today."  OT assisted with helping pt maintain balance in standing position while RN placed bed pan in chair.  Per RN and pt, he has been having loose bowel movements throughout the day.     Patient left up in chair with all lines intact, call button in reach and wife present.  RN stepped out of room with OT.    [facility] 6 Click:  [facility] Total Score: 17    Treatment & Education:  *Pt performed 3 UE exercises while holding onto towel to address endurance needed for ADLs: 2 sets x 10 reps per " exercise  -Shoulder flexion  -Chest press  -Forward Rows  *Pt performed sit to stand trial from chair to allow bed pan to be placed.  Session concluded afterwards 2* pt needing increased time for bowel movement.    *Pt educated on benefits of performing exercises several times during the day; potentially before meals to help remembering to do them easier.  Pt verbalized understanding.   *POC reviewed with pt and spouse.  RN communicated that bedside commode was ordered yesterday, but had not yet been delivered.    Education:    Assessment:     Gurvinder Mckeon is a 85 y.o. male with a medical diagnosis of Acute on chronic intracranial subdural hematoma.  He presents with the following performance deficits affecting function: weakness, impaired endurance, impaired self care skills, gait instability, impaired functional mobilty, impaired balance, decreased safety awareness.  Pt tolerated UE exercises well despite mild pain reported in side after starting activity.  Upon completion of three exercises pt stated he needed to have a bowel movement.  Pt performed sit to stand transfer with Min A, but was unable to ambulate to bathroom this date 2* decreased postural stability while standing.  Pt is making progress towards goals and would continue to benefit from skilled OT services to address problems listed below and increase independence with ADLs.    Rehab Prognosis:  Good; patient would benefit from acute skilled OT services to address these deficits and reach maximum level of function.       Plan:     Patient to be seen 4 x/week to address the above listed problems via self-care/home management, therapeutic activities, therapeutic exercises, neuromuscular re-education  · Plan of Care Expires: 05/13/18  · Plan of Care Reviewed with: spouse, patient    This Plan of care has been discussed with the patient who was involved in its development and understands and is in agreement with the identified goals and  treatment plan    GOALS:    Occupational Therapy Goals        Problem: Occupational Therapy Goal    Goal Priority Disciplines Outcome Interventions   Occupational Therapy Goal     OT, PT/OT Ongoing (interventions implemented as appropriate)    Description:  Goals to be met by: 14 days (4/26/18)    Patient will increase functional independence with ADLs by performing:    LE Dressing with Minimal Assistance.  Grooming while standing at sink including oral care, face washing, and hand washing with Supervision and one or fewer seated rest breaks.  Toileting from toilet with Supervision for hygiene and clothing management.   Toilet transfer to toilet with Supervision.  Functional mobility at household distance with Supervision using RW.                      Time Tracking:     OT Date of Treatment: 04/14/18  OT Start Time: 0905  OT Stop Time: 0918  OT Total Time (min): 13 min    Billable Minutes:Therapeutic Exercise 13    EVERTON Austin  4/14/2018

## 2018-04-14 NOTE — ASSESSMENT & PLAN NOTE
On review of tele, patient had one episode of jessie into 40s right after having BM  Likely vagal in the setting of a SDH with midline shift  No indication for any further cardiac intervention of workup  Cardiology will sign off  Please re-consult as needed and thank you for allowing us to be a part of Mr Mckeon's care

## 2018-04-14 NOTE — PLAN OF CARE
Problem: Occupational Therapy Goal  Goal: Occupational Therapy Goal  Goals to be met by: 14 days (4/26/18)    Patient will increase functional independence with ADLs by performing:    LE Dressing with Minimal Assistance.  Grooming while standing at sink including oral care, face washing, and hand washing with Supervision and one or fewer seated rest breaks.  Toileting from toilet with Supervision for hygiene and clothing management.   Toilet transfer to toilet with Supervision.  Functional mobility at household distance with Supervision using RW.       Pt is making progress towards goals.    EVERTON Austin  4/14/2018

## 2018-04-14 NOTE — SUBJECTIVE & OBJECTIVE
Past Medical History:   Diagnosis Date    Anticoagulant long-term use     Anxiety     Arthritis     Bradycardia     CHF (congestive heart failure)     Coronary artery disease     Generalized headaches     Hemorrhoids     Hypercholesterolemia     Hypertension     Kidney stone     MI (myocardial infarction)     Prostate cancer     Skin cancer     UTI (urinary tract infection)     Weakness        Past Surgical History:   Procedure Laterality Date    CARDIAC CATHETERIZATION      WITH STENTS    CATARACT SX Bilateral     CHOLECYSTECTOMY      CORONARY STENT PLACEMENT      HEMORRHOID SURGERY      KNEE SX Right     FOR DAMAGED CARTILAGE       Review of patient's allergies indicates:   Allergen Reactions    Codeine     Cyclobenzaprine        No current facility-administered medications on file prior to encounter.      Current Outpatient Prescriptions on File Prior to Encounter   Medication Sig    aspirin (ECOTRIN) 81 MG EC tablet Take 81 mg by mouth once daily.    atorvastatin (LIPITOR) 40 MG tablet Take 20 mg by mouth once daily.     clopidogrel (PLAVIX) 75 mg tablet Take 75 mg by mouth once daily.    furosemide (LASIX) 20 MG tablet Take 20 mg by mouth once daily at 6am.    isosorbide mononitrate (IMDUR) 60 MG 24 hr tablet Take 60 mg by mouth once daily.    lorazepam (ATIVAN) 0.5 MG tablet Take 0.5 mg by mouth 2 (two) times daily as needed.    nitroGLYCERIN (NITROSTAT) 0.4 MG SL tablet Place 0.4 mg under the tongue every 5 (five) minutes as needed.    ranolazine (RANEXA) 1,000 mg Tb12 Take 1,000 mg by mouth 2 (two) times daily.    sertraline (ZOLOFT) 25 MG tablet Take 50 mg by mouth once daily.      Family History     Problem Relation (Age of Onset)    Heart disease Mother        Social History Main Topics    Smoking status: Never Smoker    Smokeless tobacco: Never Used    Alcohol use No    Drug use: No    Sexual activity: Not on file     Review of Systems   Constitution: Negative.    HENT: Negative.    Eyes: Negative.    Cardiovascular: Negative.    Respiratory: Negative.    Endocrine: Negative.    Skin: Negative.    Musculoskeletal: Negative.    Gastrointestinal: Negative.    Genitourinary: Negative.    Neurological: Negative.      Objective:     Vital Signs (Most Recent):  Temp: 98.5 °F (36.9 °C) (04/14/18 0700)  Pulse: 84 (04/14/18 1134)  Resp: (!) 53 (04/14/18 1134)  BP: 105/60 (04/14/18 1134)  SpO2: 96 % (04/14/18 1134) Vital Signs (24h Range):  Temp:  [97.6 °F (36.4 °C)-98.7 °F (37.1 °C)] 98.5 °F (36.9 °C)  Pulse:  [] 84  Resp:  [17-53] 53  SpO2:  [91 %-97 %] 96 %  BP: ()/() 105/60     Weight: 73.9 kg (162 lb 14.7 oz)  Body mass index is 26.3 kg/m².    SpO2: 96 %  O2 Device (Oxygen Therapy): nasal cannula w/ humidification      Intake/Output Summary (Last 24 hours) at 04/14/18 1246  Last data filed at 04/14/18 0700   Gross per 24 hour   Intake           866.13 ml   Output              525 ml   Net           341.13 ml       Lines/Drains/Airways     Peripheral Intravenous Line                 Peripheral IV - Single Lumen 04/12/18 1733 Left Forearm 1 day         Peripheral IV - Single Lumen 04/13/18 0349 Right Antecubital 1 day                Physical Exam   Constitutional: He is oriented to person, place, and time. He appears well-developed and well-nourished.   HENT:   Head: Normocephalic and atraumatic.   Eyes: Conjunctivae and EOM are normal. Pupils are equal, round, and reactive to light.   Neck: Normal range of motion. Neck supple. No JVD present.   Cardiovascular: Normal rate, regular rhythm and normal heart sounds.  Exam reveals no gallop and no friction rub.    No murmur heard.  Pulmonary/Chest: Effort normal and breath sounds normal. No respiratory distress. He has no wheezes. He has no rales. He exhibits no tenderness.   Abdominal: Soft. Bowel sounds are normal. He exhibits no distension. There is no tenderness.   Musculoskeletal: Normal range of motion. He  exhibits no edema or tenderness.   Neurological: He is alert and oriented to person, place, and time.   Skin: Skin is warm and dry. No erythema. No pallor.       Significant Labs:   Recent Lab Results       04/14/18  0603 04/14/18  0359 04/13/18  2342 04/13/18  1802 04/13/18  1745      Immature Granulocytes  0.4        Immature Grans (Abs)  0.03  Comment:  Mild elevation in immature granulocytes is non specific and   can be seen in a variety of conditions including stress response,   acute inflammation, trauma and pregnancy. Correlation with other   laboratory and clinical findings is essential.          Albumin  2.8(L)        Alkaline Phosphatase  106        ALT  16        Anion Gap  7(L)        AST  25        Baso #  0.03        Basophil%  0.4        Total Bilirubin  0.8  Comment:  For infants and newborns, interpretation of results should be based  on gestational age, weight and in agreement with clinical  observations.  Premature Infant recommended reference ranges:  Up to 24 hours.............<8.0 mg/dL  Up to 48 hours............<12.0 mg/dL  3-5 days..................<15.0 mg/dL  6-29 days.................<15.0 mg/dL          BUN, Bld  11        Calcium  8.0(L)        Chloride  108        CO2  22(L)        Creatinine  0.9        Differential Method  Automated        eGFR if   >60.0        eGFR if non   >60.0  Comment:  Calculation used to obtain the estimated glomerular filtration  rate (eGFR) is the CKD-EPI equation.           Eos #  0.3        Eosinophil%  3.7        Glucose  82        Gran # (ANC)  6.2        Gran%  73.5(H)        Hematocrit  37.1(L)        Hemoglobin  12.6(L)        Coumadin Monitoring INR  1.2  Comment:  Coumadin Therapy:  2.0 - 3.0 for INR for all indicators except mechanical heart valves  and antiphospholipid syndromes which should use 2.5 - 3.5.          Lymph #  1.3        Lymph%  15.1(L)        Magnesium  2.1  2.3      MCH  34.0(H)        MCHC  34.0         MCV  100(H)        Mono #  0.6        Mono%  6.9        MPV  11.6        nRBC  0        Phosphorus  3.5  3.6      Platelets  136(L)        POCT Glucose 88  84  92     Potassium  4.1  4.1      Total Protein  5.9(L)        Protime  12.1        RBC  3.71(L)        RDW  13.8        Sodium  137        WBC  8.37                        Significant Imaging: CT scan: Acute on chronic subdural hematoma overlying the left cerebral convexity with stable right midline shift of 6 mm and mild right subfalcine herniation. and Echocardiogram:   2D echo with color flow doppler:   Results for orders placed or performed during the hospital encounter of 04/12/18   Echo doppler color flow   Result Value Ref Range    EF 40 (A) 55 - 65    Mitral Valve Regurgitation TRIVIAL     Diastolic Dysfunction Yes (A)     Aortic Valve Stenosis MILD (A)     Est. PA Systolic Pressure 47.61 (A)     Mitral Valve Mobility NORMAL     Tricuspid Valve Regurgitation TRIVIAL

## 2018-04-14 NOTE — HPI
85 y.o. male with pmhx of TSDH (4/1/18), CAD s/p PCI approx 10ya, Bradycardia, CHF (EF 40-45%, +DD), HTN, and HLD who [place] [facility] for a higher level of care for Acute on Chronic SDH. The patient initially presented to [facility] on 04/01/18 s/p fall and head injury. CT head on the same day revealed SDH in high right parietal vertex without mass effect. His plavix and ASA were discontinued. On 04/12/18 he was sent for a repeat CT, by PCP, with a plan to restart plavix and ASA. The CT revealed new 2 cm left frontal parietal SDH with L to R mass effect of 7 mm. Pt denies new falls or neurologic symptoms.  This morning patient was bradycardic and so cardiology is consulted for further assistance.     7/2017:  LAD: 20% ostial, 50% mid, patent within stent  LCx: prox and mid stents patent, 40% ostial OM1, 40% distal  RCA: prox 30%, oPDA 60%    4/13/18 ECHO  CONCLUSIONS     1 - Mildly to moderately depressed left ventricular systolic function (EF 40-45%).     2 - Eccentric hypertrophy.     3 - Moderate left atrial enlargement.     4 - Impaired LV relaxation, elevated LAP (grade 2 diastolic dysfunction).     5 - Mild aortic stenosis, BEATRIZ = 1.36 cm2, AVAi = 0.71 cm2/m2, peak velocity = 2.62 m/s, mean gradient = 14 mmHg.     6 - Normal right ventricular systolic function .     7 - Pulmonary hypertension. The estimated PA systolic pressure is greater than 48 mmHg.

## 2018-04-15 PROBLEM — I10 ESSENTIAL HYPERTENSION: Status: RESOLVED | Noted: 2018-04-13 | Resolved: 2018-04-15

## 2018-04-15 PROBLEM — I50.42 CHRONIC COMBINED SYSTOLIC AND DIASTOLIC HEART FAILURE: Status: RESOLVED | Noted: 2018-04-14 | Resolved: 2018-04-15

## 2018-04-15 PROBLEM — E44.1 MILD PROTEIN-CALORIE MALNUTRITION: Status: RESOLVED | Noted: 2018-04-13 | Resolved: 2018-04-15

## 2018-04-15 PROBLEM — I60.9 SAH (SUBARACHNOID HEMORRHAGE): Status: RESOLVED | Noted: 2018-04-13 | Resolved: 2018-04-15

## 2018-04-15 PROBLEM — D64.9 ANEMIA: Status: ACTIVE | Noted: 2018-04-15

## 2018-04-15 PROBLEM — I62.00 SUBDURAL HEMORRHAGE: Status: RESOLVED | Noted: 2018-04-14 | Resolved: 2018-04-15

## 2018-04-15 LAB
ALBUMIN SERPL BCP-MCNC: 2.8 G/DL
ALP SERPL-CCNC: 101 U/L
ALT SERPL W/O P-5'-P-CCNC: 14 U/L
ANION GAP SERPL CALC-SCNC: 12 MMOL/L
AST SERPL-CCNC: 23 U/L
BASOPHILS # BLD AUTO: 0.02 K/UL
BASOPHILS NFR BLD: 0.3 %
BILIRUB SERPL-MCNC: 1.3 MG/DL
BUN SERPL-MCNC: 17 MG/DL
CALCIUM SERPL-MCNC: 8.2 MG/DL
CHLORIDE SERPL-SCNC: 106 MMOL/L
CO2 SERPL-SCNC: 18 MMOL/L
CREAT SERPL-MCNC: 1.1 MG/DL
DIFFERENTIAL METHOD: ABNORMAL
EOSINOPHIL # BLD AUTO: 0.2 K/UL
EOSINOPHIL NFR BLD: 2.1 %
ERYTHROCYTE [DISTWIDTH] IN BLOOD BY AUTOMATED COUNT: 13.6 %
EST. GFR  (AFRICAN AMERICAN): >60 ML/MIN/1.73 M^2
EST. GFR  (NON AFRICAN AMERICAN): >60 ML/MIN/1.73 M^2
GLUCOSE SERPL-MCNC: 96 MG/DL
HCT VFR BLD AUTO: 38.9 %
HGB BLD-MCNC: 13.2 G/DL
IMM GRANULOCYTES # BLD AUTO: 0.01 K/UL
IMM GRANULOCYTES NFR BLD AUTO: 0.1 %
INR PPP: 1.2
LYMPHOCYTES # BLD AUTO: 0.8 K/UL
LYMPHOCYTES NFR BLD: 10 %
MAGNESIUM SERPL-MCNC: 2 MG/DL
MCH RBC QN AUTO: 33.8 PG
MCHC RBC AUTO-ENTMCNC: 33.9 G/DL
MCV RBC AUTO: 100 FL
MONOCYTES # BLD AUTO: 0.7 K/UL
MONOCYTES NFR BLD: 8.8 %
NEUTROPHILS # BLD AUTO: 6 K/UL
NEUTROPHILS NFR BLD: 78.7 %
NRBC BLD-RTO: 0 /100 WBC
PHOSPHATE SERPL-MCNC: 3.6 MG/DL
PLATELET # BLD AUTO: 128 K/UL
PMV BLD AUTO: 11.3 FL
POCT GLUCOSE: 154 MG/DL (ref 70–110)
POCT GLUCOSE: 155 MG/DL (ref 70–110)
POCT GLUCOSE: 92 MG/DL (ref 70–110)
POCT GLUCOSE: 95 MG/DL (ref 70–110)
POTASSIUM SERPL-SCNC: 4.1 MMOL/L
PROT SERPL-MCNC: 6.1 G/DL
PROTHROMBIN TIME: 12.4 SEC
RBC # BLD AUTO: 3.91 M/UL
SODIUM SERPL-SCNC: 136 MMOL/L
TROPONIN I SERPL DL<=0.01 NG/ML-MCNC: 0.03 NG/ML
WBC # BLD AUTO: 7.62 K/UL

## 2018-04-15 PROCEDURE — 85025 COMPLETE CBC W/AUTO DIFF WBC: CPT

## 2018-04-15 PROCEDURE — 11000001 HC ACUTE MED/SURG PRIVATE ROOM

## 2018-04-15 PROCEDURE — 63600175 PHARM REV CODE 636 W HCPCS: Performed by: PSYCHIATRY & NEUROLOGY

## 2018-04-15 PROCEDURE — 25000003 PHARM REV CODE 250: Performed by: PSYCHIATRY & NEUROLOGY

## 2018-04-15 PROCEDURE — 99232 SBSQ HOSP IP/OBS MODERATE 35: CPT | Mod: ,,, | Performed by: HOSPITALIST

## 2018-04-15 PROCEDURE — 99232 SBSQ HOSP IP/OBS MODERATE 35: CPT | Mod: GC,,, | Performed by: INTERNAL MEDICINE

## 2018-04-15 PROCEDURE — 93010 ELECTROCARDIOGRAM REPORT: CPT | Mod: ,,, | Performed by: INTERNAL MEDICINE

## 2018-04-15 PROCEDURE — 80053 COMPREHEN METABOLIC PANEL: CPT

## 2018-04-15 PROCEDURE — 97530 THERAPEUTIC ACTIVITIES: CPT

## 2018-04-15 PROCEDURE — 84100 ASSAY OF PHOSPHORUS: CPT

## 2018-04-15 PROCEDURE — 25000003 PHARM REV CODE 250: Performed by: NURSE PRACTITIONER

## 2018-04-15 PROCEDURE — 97116 GAIT TRAINING THERAPY: CPT

## 2018-04-15 PROCEDURE — 85610 PROTHROMBIN TIME: CPT

## 2018-04-15 PROCEDURE — 93005 ELECTROCARDIOGRAM TRACING: CPT

## 2018-04-15 PROCEDURE — 84484 ASSAY OF TROPONIN QUANT: CPT

## 2018-04-15 PROCEDURE — 63600175 PHARM REV CODE 636 W HCPCS: Performed by: STUDENT IN AN ORGANIZED HEALTH CARE EDUCATION/TRAINING PROGRAM

## 2018-04-15 PROCEDURE — 25000003 PHARM REV CODE 250: Performed by: STUDENT IN AN ORGANIZED HEALTH CARE EDUCATION/TRAINING PROGRAM

## 2018-04-15 PROCEDURE — 36415 COLL VENOUS BLD VENIPUNCTURE: CPT

## 2018-04-15 PROCEDURE — 83735 ASSAY OF MAGNESIUM: CPT

## 2018-04-15 RX ORDER — DICLOFENAC SODIUM 10 MG/G
GEL TOPICAL 2 TIMES DAILY PRN
Status: DISCONTINUED | OUTPATIENT
Start: 2018-04-15 | End: 2018-04-17 | Stop reason: HOSPADM

## 2018-04-15 RX ADMIN — ASPIRIN 81 MG CHEWABLE TABLET 81 MG: 81 TABLET CHEWABLE at 08:04

## 2018-04-15 RX ADMIN — RANOLAZINE 1000 MG: 1000 TABLET, FILM COATED, EXTENDED RELEASE ORAL at 08:04

## 2018-04-15 RX ADMIN — NITROGLYCERIN 0.4 MG: 0.4 TABLET SUBLINGUAL at 05:04

## 2018-04-15 RX ADMIN — AZITHROMYCIN 500 MG: 250 TABLET, FILM COATED ORAL at 08:04

## 2018-04-15 RX ADMIN — HEPARIN SODIUM 5000 UNITS: 5000 INJECTION, SOLUTION INTRAVENOUS; SUBCUTANEOUS at 09:04

## 2018-04-15 RX ADMIN — SENNOSIDES 8.6 MG: 8.6 TABLET, FILM COATED ORAL at 08:04

## 2018-04-15 RX ADMIN — HEPARIN SODIUM 5000 UNITS: 5000 INJECTION, SOLUTION INTRAVENOUS; SUBCUTANEOUS at 05:04

## 2018-04-15 RX ADMIN — ACETAMINOPHEN 650 MG: 325 TABLET ORAL at 05:04

## 2018-04-15 RX ADMIN — ISOSORBIDE MONONITRATE 30 MG: 30 TABLET, EXTENDED RELEASE ORAL at 08:04

## 2018-04-15 RX ADMIN — ACETAMINOPHEN 650 MG: 325 TABLET ORAL at 08:04

## 2018-04-15 RX ADMIN — HEPARIN SODIUM 5000 UNITS: 5000 INJECTION, SOLUTION INTRAVENOUS; SUBCUTANEOUS at 02:04

## 2018-04-15 RX ADMIN — ATORVASTATIN CALCIUM 40 MG: 20 TABLET, FILM COATED ORAL at 08:04

## 2018-04-15 NOTE — ASSESSMENT & PLAN NOTE
Transferred to [facility] for new bleed on Ct. Repeat CT head with stable midline shift   - neurosurgery signed off, no surgical intervention necessary   - cont asa   - plan is to never re-start plavix   - needs PT re-eval since pt is too weak to go home

## 2018-04-15 NOTE — SUBJECTIVE & OBJECTIVE
Past Medical History:   Diagnosis Date    Anticoagulant long-term use     Anxiety     Arthritis     Bradycardia     CHF (congestive heart failure)     Chronic combined systolic and diastolic heart failure 4/14/2018    EF 40-45%    Coronary artery disease     Coronary artery disease involving native coronary artery of native heart without angina pectoris 4/14/2018    Essential hypertension 4/13/2018    Generalized headaches     Hemorrhoids     Hypercholesterolemia     Hypertension     Kidney stone     MI (myocardial infarction)     Prostate cancer     Skin cancer     UTI (urinary tract infection)     Weakness        Past Surgical History:   Procedure Laterality Date    CARDIAC CATHETERIZATION      WITH STENTS    CATARACT SX Bilateral     CHOLECYSTECTOMY      CORONARY STENT PLACEMENT      HEMORRHOID SURGERY      KNEE SX Right     FOR DAMAGED CARTILAGE       Review of patient's allergies indicates:   Allergen Reactions    Codeine     Cyclobenzaprine        No current facility-administered medications on file prior to encounter.      Current Outpatient Prescriptions on File Prior to Encounter   Medication Sig    aspirin (ECOTRIN) 81 MG EC tablet Take 81 mg by mouth once daily.    atorvastatin (LIPITOR) 40 MG tablet Take 20 mg by mouth once daily.     clopidogrel (PLAVIX) 75 mg tablet Take 75 mg by mouth once daily.    furosemide (LASIX) 20 MG tablet Take 20 mg by mouth once daily at 6am.    isosorbide mononitrate (IMDUR) 60 MG 24 hr tablet Take 60 mg by mouth once daily.    lorazepam (ATIVAN) 0.5 MG tablet Take 0.5 mg by mouth 2 (two) times daily as needed.    nitroGLYCERIN (NITROSTAT) 0.4 MG SL tablet Place 0.4 mg under the tongue every 5 (five) minutes as needed.    ranolazine (RANEXA) 1,000 mg Tb12 Take 1,000 mg by mouth 2 (two) times daily.    sertraline (ZOLOFT) 25 MG tablet Take 50 mg by mouth once daily.      Family History     Problem Relation (Age of Onset)    Heart disease  Mother        Social History Main Topics    Smoking status: Never Smoker    Smokeless tobacco: Never Used    Alcohol use No    Drug use: No    Sexual activity: Not on file     Review of Systems   Constitutional: Negative for appetite change, chills, fever and unexpected weight change.   HENT: Negative for congestion, rhinorrhea, sinus pain, sneezing and sore throat.    Eyes: Negative for visual disturbance.   Respiratory: Negative for cough, shortness of breath and wheezing.    Cardiovascular: Negative for chest pain, palpitations and leg swelling.   Gastrointestinal: Negative for abdominal distention, abdominal pain, blood in stool, constipation, diarrhea, nausea and vomiting.   Genitourinary: Negative for discharge, dysuria and hematuria.   Skin: Negative for rash.   Neurological: Negative for dizziness, seizures, syncope and headaches.   Psychiatric/Behavioral: Negative for suicidal ideas.     Objective:     Vital Signs (Most Recent):  Temp: 97.5 °F (36.4 °C) (04/15/18 1212)  Pulse: 70 (04/15/18 1212)  Resp: 18 (04/15/18 1212)  BP: (!) 91/53 (04/15/18 1212)  SpO2: 95 % (04/15/18 1212) Vital Signs (24h Range):  Temp:  [97.5 °F (36.4 °C)-98.9 °F (37.2 °C)] 97.5 °F (36.4 °C)  Pulse:  [44-90] 70  Resp:  [16-33] 18  SpO2:  [92 %-97 %] 95 %  BP: ()/(53-68) 91/53     Weight: 76.9 kg (169 lb 8.5 oz)  Body mass index is 27.36 kg/m².    Physical Exam   Constitutional: He is oriented to person, place, and time. He appears well-developed and well-nourished.   HENT:   Head: Normocephalic and atraumatic.   Nose: Nose normal.   Mouth/Throat: Oropharynx is clear and moist. No oropharyngeal exudate.   Eyes: Pupils are equal, round, and reactive to light. Right eye exhibits no discharge. Left eye exhibits no discharge. No scleral icterus.   Neck: Normal range of motion. Neck supple. No JVD present.   Cardiovascular: Normal rate, regular rhythm, normal heart sounds and intact distal pulses.    Pulmonary/Chest: Effort  normal and breath sounds normal. No respiratory distress. He has no wheezes. He exhibits no tenderness.   Abdominal: Soft. Bowel sounds are normal. He exhibits no distension. There is no tenderness. There is no guarding.   Musculoskeletal: Normal range of motion. He exhibits no edema, tenderness or deformity.   Able to move all his extremities   Lymphadenopathy:     He has no cervical adenopathy.   Neurological: He is alert and oriented to person, place, and time. No cranial nerve deficit or sensory deficit.   Oriented to person, place, but not to time.   Skin: Skin is warm and dry. No rash noted. No erythema.   Psychiatric: He has a normal mood and affect. His behavior is normal. Judgment and thought content normal.         CRANIAL NERVES     CN III, IV, VI   Pupils are equal, round, and reactive to light.       Significant Labs: All pertinent labs within the past 24 hours have been reviewed.    Significant Imaging: I have reviewed and interpreted all pertinent imaging results/findings within the past 24 hours.

## 2018-04-15 NOTE — ASSESSMENT & PLAN NOTE
Repeat CT head with stable midline shift   - neurosurgery signed off, no surgical intervention necessary   - cont asa   - plan is to never re-start plavix

## 2018-04-15 NOTE — SUBJECTIVE & OBJECTIVE
Interval History: >4 elements OR status of 3 inpatient conditions  CT head 4/13: L convexity subacute SDH with no significant mass effect. Severe bradycardia. ECG: sinus jessie with intermittent EBBB and 1st degree AVB. Cardiooogy will be consulted.  Review of Systems  2 systems OR Unable to obtain a complete ROS due to level of consciousness.  Objective:     Vitals:  Temp: 98.1 °F (36.7 °C)  Pulse: 88  Rhythm: normal sinus rhythm  BP: 119/62  MAP (mmHg): 84  Resp: 16  SpO2: 97 %  O2 Device (Oxygen Therapy): nasal cannula    Temp  Min: 98.1 °F (36.7 °C)  Max: 98.9 °F (37.2 °C)  Pulse  Min: 68  Max: 88  BP  Min: 89/53  Max: 176/102  MAP (mmHg)  Min: 65  Max: 128  Resp  Min: 16  Max: 53  SpO2  Min: 92 %  Max: 97 %    04/13 0701 - 04/14 0700  In: 866.1 [P.O.:100; I.V.:766.1]  Out: 525 [Urine:525]   Unmeasured Output  Urine Occurrence: 3  Stool Occurrence: 1       Physical Exam  Unable to test gait due to level of consciousness.    General   HEENT:   Chest Heart RRR / Lungs Clear to auscultation  Abdomen: Soft nontender + BS  Extremities: OK distal pulses.  Skin:   Neurological Exam:  MS; Alert, oriented to P/T/P/R, No language abnormalities.   CN: II-XII   Motor: LUE  5 /5 / RUE 5/5  Tone normal bilaterally             LLE   5/5 /  RLE  5/5  Tone normal bilaterally  Sensory: LT/PP/T/ Vibration                  Complex sensory modalities: not tested  DTR:  normal throughout.  Coordination /Fine motor:   Gait: Not tested.  Meningeal signs: Absent    Medications:  Continuous Scheduled  aspirin 81 mg Daily   [START ON 4/15/2018] atorvastatin 40 mg Daily   atropine     azithromycin 500 mg Daily   carvedilol 3.125 mg BID   heparin (porcine) 5,000 Units Q8H   [START ON 4/15/2018] isosorbide mononitrate 30 mg Daily   ranolazine 1,000 mg BID   senna 8.6 mg Daily   PRN  acetaminophen 650 mg Q6H PRN   labetalol 10 mg Q4H PRN   magnesium oxide 800 mg PRN   magnesium oxide 800 mg PRN   nitroGLYCERIN 0.4 mg Q5 Min PRN    pneumoc 13-melvina conj-dip cr(PF) 0.5 mL vaccine x 1 dose   potassium chloride 10% 40 mEq PRN   potassium chloride 10% 40 mEq PRN   potassium chloride 10% 60 mEq PRN   potassium, sodium phosphates 2 packet PRN   potassium, sodium phosphates 2 packet PRN   potassium, sodium phosphates 2 packet PRN     Today I personally reviewed pertinent medications, lines/drains/airways, imaging, cardiology, lab results, microbiology results, notably:    Diet  Diet Cardiac    CMP:   Recent Labs  Lab 04/14/18 0359   CALCIUM 8.0*   ALBUMIN 2.8*   PROT 5.9*      K 4.1   CO2 22*      BUN 11   CREATININE 0.9   ALKPHOS 106   ALT 16   AST 25   BILITOT 0.8      BMP:   Recent Labs  Lab 04/14/18 0359      K 4.1      CO2 22*   BUN 11   CREATININE 0.9   CALCIUM 8.0*      CBC:   Recent Labs  Lab 04/14/18 0359   WBC 8.37   RBC 3.71*   HGB 12.6*   HCT 37.1*   *   *   MCH 34.0*   MCHC 34.0      Lipid Panel:   Recent Labs  Lab 04/13/18 0317   CHOL 121   LDLCALC 64.0   HDL 41   TRIG 80      Coagulation:   Recent Labs  Lab 04/13/18  0411 04/14/18 0359   INR 1.4* 1.2   APTT 26.0  --       Platelet Aggregation Study: No results for input(s): PLTAGG, PLTAGINTERP, PLTAGREGLACO, ADPPLTAGGREG in the last 168 hours.  Hgb A1C:   Recent Labs  Lab 04/13/18 0317   HGBA1C 4.8      TSH:   Recent Labs  Lab 04/13/18 0317   TSH 2.393      No results for input(s): PH, PCO2, PO2, HCO3, POCSATURATED, BE in the last 24 hours.

## 2018-04-15 NOTE — ASSESSMENT & PLAN NOTE
No bradycardia on tele review  Episode yesterday Likely vagal in the setting Of having a BM  No indication for any further cardiac intervention of workup  Cardiology will sign off  Please re-consult as needed and thank you for allowing us to be a part of Mr Mckeon's care

## 2018-04-15 NOTE — NURSING
"Spoke w/ Samantha (daughter) who stated pt was having visual hallucinations aprox 1 hour [facility]. Per Samantha pt asked her "why is that man in here fixing the Tv?" which was actually the white board.   Neuro check results have pt D/O to time and place. Daughter stated pt is usually oriented to place. Samantha also requested another CT scan r/t changes. Education provided to family r/t CT scan frequency and need.     Spoke with Vin from Neuro on call who came to bedside for assessment. At that time pt was reoriented to place and denied any visual hallucinations. Pt is resting comfortably with no overt signs of neuro change or distress at this time. WCTM    "

## 2018-04-15 NOTE — ASSESSMENT & PLAN NOTE
Resolved. Contributed to vasovagal response while having bowel movement   - cardiology signed off   - monitor

## 2018-04-15 NOTE — PT/OT/SLP PROGRESS
Physical Therapy Treatment    Patient Name:  Gurvinder Mckeon   MRN:  [id number]    Recommendations:     Discharge Recommendations:  home with home health   Discharge Equipment Recommendations: wheelchair   Barriers to discharge: None    Assessment:     Gurvinder Mckeon is a 85 y.o. male admitted with a medical diagnosis of Acute on chronic intracranial subdural hematoma.  He presents with the following impairments/functional limitations:  weakness, impaired endurance, impaired cardiopulmonary response to activity, gait instability, impaired balance . Patient showed decreased ednurance, but no shortness of breath or signs of distress noted during his treatment. Patient appeared more unsteady while making turns. Patient would benefit from P.T. To address deficits.    Rehab Prognosis:  good; patient would benefit from acute skilled PT services to address these deficits and reach maximum level of function.      Recent Surgery: * No surgery found *      Plan:     During this hospitalization, patient to be seen 4 x/week to address the above listed problems via therapeutic activities, gait training, therapeutic exercises, neuromuscular re-education  · Plan of Care Expires:  05/13/18   Plan of Care Reviewed with: patient, spouse    Subjective     Communicated with RN prior to session.  Patient found in supine upon PT entry to room, agreeable to treatment.      Chief Complaint: weakness  Patient comments/goals: to get stronger  Pain/Comfort:  · Pain Rating 1: 0/10  · Pain Rating Post-Intervention 1: 0/10    Patients cultural, spiritual, [employer] conflicts given the current situation: None    Objective:     Patient found with: telemetry, oxygen, SCD     General Precautions: Standard, aspiration, fall   Orthopedic Precautions:N/A   Braces: N/A     Functional Mobility:  · Bed Mobility:     · Rolling Right: stand by assistance  · Scooting: contact guard assistance  · Supine to Sit: stand by assistance  · Sit to  Supine: stand by assistance  · Transfers:     · Sit to Stand:  minimum assistance with rolling walker  · Bed to Chair: minimum assistance with  rolling walker  using  Stand Pivot  · Gait: 3 ft x 4 trials and 28 ft x 3 trials using RW with min to CGA with @2L O2 intact.      AM-PAC 6 CLICK MOBILITY  Turning over in bed (including adjusting bedclothes, sheets and blankets)?: 3  Sitting down on and standing up from a chair with arms (e.g., wheelchair, bedside commode, etc.): 3  Moving from lying on back to sitting on the side of the bed?: 4  Moving to and from a bed to a chair (including a wheelchair)?: 3  Need to walk in hospital room?: 3  Climbing 3-5 steps with a railing?: 2  Total Score: 18       Therapeutic Activities and Exercises:   Donned/Doffed a gown. Assisted patient using urinal in standing with CGA. Weight shifting activity in standing with RW for support to assess balance and safety prior to gait training. Pt. Returned back to bed and donned SCD's    Patient left HOB elevated with all lines intact, call button in reach, bed alarm on and spouse present..    GOALS:    Physical Therapy Goals        Problem: Physical Therapy Goal    Goal Priority Disciplines Outcome Goal Variances Interventions   Physical Therapy Goal     PT/OT, PT Ongoing (interventions implemented as appropriate)     Description:  Goals to be met by: 18    Patient will increase functional independence with mobility by performin. Supine to sit with supervision   2. Sit to supine with supervision   3. Sit to stand transfer with Supervision with appropriate AD.   4. Gait  x 75 feet with Stand-by Assistance using Rolling Walker.   5. Ascend/descend 3 stairs with left Handrails Contact Guard Assistance.   6. Stand for 3 minutes with Stand-by Assistance using Rolling Walker  7. Lower extremity exercise program x20 reps per handout, with assistance as needed                      Time Tracking:     PT Received On: 04/15/18  PT Start  Time: 1350     PT Stop Time: 1417  PT Total Time (min): 27 min     Billable Minutes: Gait Training 15 and Therapeutic Activity 12    Treatment Type: Treatment  PT/PTA: PTA     PTA Visit Number: 1     Rell Diamond PTA  04/15/2018

## 2018-04-15 NOTE — PLAN OF CARE
Problem: Fall Risk (Adult)  Goal: Absence of Falls  Patient will demonstrate the desired outcomes by discharge/transition of care.   Outcome: Ongoing (interventions implemented as appropriate)  Pt has Hx of fall w/ injury SDH. Pt is confused and not sleeping well. Pt continually tried to get out of bed despite redirection and education on urinal use. Pt has a bedside commode for BM's and urinal to monitor I&O's. Bed alarm is on and [employer] ordered and initiated. Pt remained free from falls throughout shift, SR up x 2, bed in low and locked position, call bell within reach, No complaints or evidence of distress at this time. TM

## 2018-04-15 NOTE — PLAN OF CARE
Problem: Patient Care Overview  Goal: Plan of Care Review  Outcome: Ongoing (interventions implemented as appropriate)  SR up x2, call bell in reach, bed in low and locked position, skid proof socks on. Care plan explained to patient, no additional complaints at this time. WCTM

## 2018-04-15 NOTE — PLAN OF CARE
Problem: Physical Therapy Goal  Goal: Physical Therapy Goal  Goals to be met by: 18    Patient will increase functional independence with mobility by performin. Supine to sit with supervision   2. Sit to supine with supervision   3. Sit to stand transfer with Supervision with appropriate AD.   4. Gait  x 75 feet with Stand-by Assistance using Rolling Walker.   5. Ascend/descend 3 stairs with left Handrails Contact Guard Assistance.   6. Stand for 3 minutes with Stand-by Assistance using Rolling Walker  7. Lower extremity exercise program x20 reps per handout, with assistance as needed     Outcome: Ongoing (interventions implemented as appropriate)  Patient progressing well as he was able to initiate gait training.

## 2018-04-15 NOTE — PROGRESS NOTES
[facility]  Neurocritical Care  Progress Note    Admit Date: 4/12/2018  Service Date: 04/14/2018  Length of Stay: 1    Subjective:     Chief Complaint: Acute on chronic intracranial subdural hematoma    History of Present Illness: Mr. Mckeon is a 85 y.o. male with pmhx of TSDH (4/1/18), MI s/p PCI, Bradycardia, CAD, CHF, HTN, and HLD who presents to [facility] for a higher level of care for Acute on Chronic SDH. The patient initially presented to [facility] on 04/01/18 s/p fall and head injury. CT head on the same day revealed SDH in high right parietal vertex without mass effect. His plavix and ASA were discontinued. Today (04/12/18) he was sent for a repeat CT, by PCP, with a plan to restart plavix and ASA. The CT revealed new 2 cm left frontal parietal SDH with L to R mass effect of 7 mm. Pt denies new falls, change in LOC, vision changes, numbness/tingling, loss of strength, or gait changes.         Hospital Course: 04/13: Admit to [facility], [facility]  04/14: CT head 4/13: L convexity subacute SDH with no significant mass effect. Severe bradycardia. ECG: sinus jessie with intermittent EBBB and 1st degree AVB. Cardiooogy will be consulted.    Interval History: >4 elements OR status of 3 inpatient conditions  CT head 4/13: L convexity subacute SDH with no significant mass effect. Severe bradycardia. ECG: sinus jessie with intermittent EBBB and 1st degree AVB. Cardiooogy will be consulted.  Review of Systems  2 systems OR Unable to obtain a complete ROS due to level of consciousness.  Objective:     Vitals:  Temp: 98.1 °F (36.7 °C)  Pulse: 88  Rhythm: normal sinus rhythm  BP: 119/62  MAP (mmHg): 84  Resp: 16  SpO2: 97 %  O2 Device (Oxygen Therapy): nasal cannula    Temp  Min: 98.1 °F (36.7 °C)  Max: 98.9 °F (37.2 °C)  Pulse  Min: 68  Max: 88  BP  Min: 89/53  Max: 176/102  MAP (mmHg)  Min: 65  Max: 128  Resp  Min: 16  Max: 53  SpO2  Min: 92 %  Max: 97 %    04/13 0701 - 04/14 0700  In: 866.1  [P.O.:100; I.V.:766.1]  Out: 525 [Urine:525]   Unmeasured Output  Urine Occurrence: 3  Stool Occurrence: 1       Physical Exam  Unable to test gait due to level of consciousness.    General   HEENT:   Chest Heart RRR / Lungs Clear to auscultation  Abdomen: Soft nontender + BS  Extremities: OK distal pulses.  Skin:   Neurological Exam:  MS; Alert, oriented to P/T/P/R, No language abnormalities.   CN: II-XII   Motor: LUE  5 /5 / RUE 5/5  Tone normal bilaterally             LLE   5/5 /  RLE  5/5  Tone normal bilaterally  Sensory: LT/PP/T/ Vibration                  Complex sensory modalities: not tested  DTR:  normal throughout.  Coordination /Fine motor:   Gait: Not tested.  Meningeal signs: Absent    Medications:  Continuous Scheduled  aspirin 81 mg Daily   [START ON 4/15/2018] atorvastatin 40 mg Daily   atropine     azithromycin 500 mg Daily   carvedilol 3.125 mg BID   heparin (porcine) 5,000 Units Q8H   [START ON 4/15/2018] isosorbide mononitrate 30 mg Daily   ranolazine 1,000 mg BID   senna 8.6 mg Daily   PRN  acetaminophen 650 mg Q6H PRN   labetalol 10 mg Q4H PRN   magnesium oxide 800 mg PRN   magnesium oxide 800 mg PRN   nitroGLYCERIN 0.4 mg Q5 Min PRN   pneumoc 13-melvina conj-dip cr(PF) 0.5 mL vaccine x 1 dose   potassium chloride 10% 40 mEq PRN   potassium chloride 10% 40 mEq PRN   potassium chloride 10% 60 mEq PRN   potassium, sodium phosphates 2 packet PRN   potassium, sodium phosphates 2 packet PRN   potassium, sodium phosphates 2 packet PRN     Today I personally reviewed pertinent medications, lines/drains/airways, imaging, cardiology, lab results, microbiology results, notably:    Diet  Diet Cardiac    CMP:   Recent Labs  Lab 04/14/18  0359   CALCIUM 8.0*   ALBUMIN 2.8*   PROT 5.9*      K 4.1   CO2 22*      BUN 11   CREATININE 0.9   ALKPHOS 106   ALT 16   AST 25   BILITOT 0.8      BMP:   Recent Labs  Lab 04/14/18  0359      K 4.1      CO2 22*   BUN 11   CREATININE 0.9    CALCIUM 8.0*      CBC:   Recent Labs  Lab 04/14/18 0359   WBC 8.37   RBC 3.71*   HGB 12.6*   HCT 37.1*   *   *   MCH 34.0*   MCHC 34.0      Lipid Panel:   Recent Labs  Lab 04/13/18 0317   CHOL 121   LDLCALC 64.0   HDL 41   TRIG 80      Coagulation:   Recent Labs  Lab 04/13/18  0411 04/14/18 0359   INR 1.4* 1.2   APTT 26.0  --       Platelet Aggregation Study: No results for input(s): PLTAGG, PLTAGINTERP, PLTAGREGLACO, ADPPLTAGGREG in the last 168 hours.  Hgb A1C:   Recent Labs  Lab 04/13/18 0317   HGBA1C 4.8      TSH:   Recent Labs  Lab 04/13/18 0317   TSH 2.393      No results for input(s): PH, PCO2, PO2, HCO3, POCSATURATED, BE in the last 24 hours.            Assessment/Plan:     No new Assessment & Plan notes have been filed under this hospital service since the last note was generated.  Service: Neuro Critical Care      Active Problem List:   1.Traumatic Subacute  L convexity SDH  2. CAD  MI s/p PCI, Bradycardia   3. HTN  4. HLD  5. Bradycardiac. Vasovagal reaction       Assessment / Plan:     Neuro:  -No meds  -Tylenol PRN.  -PT/OT  -ASA 81mg qd.today    .-Lipitor 40mg qd  -Orthostatic and please check BP on both arms.  -Bed rest.   Resp: CXR  -NC 2 Lt ./min.  -IS.  CV: Keep SBP <=160 mmHg. LVSD EF 40% LVDDF and pulmonary hypertension.Bradycardia. LAE.   -Indure 30mg qd  -Carvediolol 3.125 mg q 12hrs,with holding parameters.  -Lipitor 40mg qd  -BNP  -ASA 81mg qd.today if OK with NSU  -Atropine 1.2 amp at bedsie.  IVF/nutrition/Renal/GI:  -Cardiac diet,.  -BR  Hem / ID:  -ASA 81mg qd.today    Endo:  Lipitor 40mg qd  Prophylaxis:  -SC Heparin 5000 U q 8hrs  Advance Directives and Disposition:    Full Code. Transfer to Medicine and followed by NSU. Cardiology consultation.           Uninterrupted level 3  Follow-up /Counseling Time (not including procedures):  = 35 min     Activity Orders          Commode at bedside starting at 04/13 1040        Full Code    Aaron Wesley MD  Neurocritical  Care [facility]

## 2018-04-15 NOTE — NURSING TRANSFER
Nursing Transfer Note      4/14/2018     Transfer to [facility] 1107 from SICU 6076    Transfer via hospital bed    Transfer with oxygen tank, portable telemetry box    Transported by Lissette Fairchild, RN    Medicines sent: heparin SQ injection, ranolazine tablets    Chart send with patient: yes    Notified: daughter Samantha, [facility] receiving nurse Lorin    Patient reassessed at: 4/14/18 2100     Pt in NAD at this time, VSS. Pt tolerated transfer well.

## 2018-04-15 NOTE — NURSING
Pt has bedside commode, upon ambulation to commode pt complains of 7/10 pain to left foot. Pt states this is new pain. Will communicate findings to day shift/ medicine team.

## 2018-04-15 NOTE — SUBJECTIVE & OBJECTIVE
Interval History: complains of some back pain and should pain and weakness. HR noted to be 44 on VS check last night-Tele reviewed and shows Sinus with bigeminy with rate in 70s-EKG correlates with tele.    Review of Systems   Unable to perform ROS: other     Objective:     Vital Signs (Most Recent):  Temp: 98.1 °F (36.7 °C) (04/15/18 0814)  Pulse: 90 (04/15/18 0814)  Resp: (!) 22 (04/15/18 0814)  BP: 130/63 (04/15/18 0814)  SpO2: (!) 94 % (04/15/18 0814) Vital Signs (24h Range):  Temp:  [98 °F (36.7 °C)-98.9 °F (37.2 °C)] 98.1 °F (36.7 °C)  Pulse:  [44-90] 90  Resp:  [16-53] 22  SpO2:  [92 %-97 %] 94 %  BP: ()/(53-68) 130/63     Weight: 76.9 kg (169 lb 8.5 oz)  Body mass index is 27.36 kg/m².     SpO2: (!) 94 %  O2 Device (Oxygen Therapy): nasal cannula      Intake/Output Summary (Last 24 hours) at 04/15/18 1110  Last data filed at 04/15/18 0300   Gross per 24 hour   Intake              240 ml   Output              875 ml   Net             -635 ml       Lines/Drains/Airways     Peripheral Intravenous Line                 Peripheral IV - Single Lumen 04/12/18 1733 Left Forearm 2 days         Peripheral IV - Single Lumen 04/13/18 0349 Right Antecubital 2 days                Physical Exam  General: alert, awake and oriented   Eyes:PERRL.   Neck:no JVD   Lungs:  clear to auscultation bilaterally   Cardiovascular: Heart: regular rate and rhythm, S1, S2 normal, no murmur, click, rub or gallop.   Pulses-2+ radial, femoral  Extremities: no cyanosis or edema.   Abdomen/Rectal: Abdomen: soft, non-tender non-distented; bowel sounds normal      Significant Labs:   CMP   Recent Labs  Lab 04/13/18  1802 04/14/18  0359 04/15/18  0612   NA  --  137 136   K 4.1 4.1 4.1   CL  --  108 106   CO2  --  22* 18*   GLU  --  82 96   BUN  --  11 17   CREATININE  --  0.9 1.1   CALCIUM  --  8.0* 8.2*   PROT  --  5.9* 6.1   ALBUMIN  --  2.8* 2.8*   BILITOT  --  0.8 1.3*   ALKPHOS  --  106 101   AST  --  25 23   ALT  --  16 14   ANIONGAP   --  7* 12   ESTGFRAFRICA  --  >60.0 >60.0   EGFRNONAA  --  >60.0 >60.0   , CBC   Recent Labs  Lab 04/14/18  0359 04/15/18  0612   WBC 8.37 7.62   HGB 12.6* 13.2*   HCT 37.1* 38.9*   * 128*   , INR   Recent Labs  Lab 04/14/18  0359 04/15/18  0612   INR 1.2 1.2    and Troponin No results for input(s): TROPONINI in the last 48 hours.    Significant Imaging: Echocardiogram:   2D echo with color flow doppler:   Results for orders placed or performed during the hospital encounter of 04/12/18   Echo doppler color flow   Result Value Ref Range    EF 40 (A) 55 - 65    Mitral Valve Regurgitation TRIVIAL     Diastolic Dysfunction Yes (A)     Aortic Valve Stenosis MILD (A)     Est. PA Systolic Pressure 47.61 (A)     Mitral Valve Mobility NORMAL     Tricuspid Valve Regurgitation TRIVIAL

## 2018-04-15 NOTE — PROGRESS NOTES
Patient complained of chest pain. Called MD. Administered PRN Nitrogylcerin for chest pain. Will continue to monitor.

## 2018-04-15 NOTE — HOSPITAL COURSE
Evaluated by neurosurgery who did not recommend surgical intervention. ASA re-started. Stop Plavix. Cardiology evaluated, ok with stopping Plavix. Asymptomatic bradycardia, per cardiology, patient had one episode of jessie into 40s right after having BM, likely vagal in the setting of a SDH with midline shift. No indication for any further cardiac intervention of workup. Transfer to SNF

## 2018-04-15 NOTE — PROGRESS NOTES
[facility] Hospital Medicine  Progress Note    Patient Name: Gurvinder Mckeon  MRN: [id number]  Patient Class: IP- Inpatient   Admission Date: 4/12/2018  Length of Stay: 2 days  Attending Physician: Henrry Laird MD  Primary Care Provider: Santos Maddox MD    [facility] Medicine Team: [facility] MED A Henrry Laird MD    Subjective:     Principal Problem:Acute on chronic intracranial subdural hematoma    HPI:  Mr. Mckeon is a 85 y.o. male with pmhx of TSDH (4/1/18), MI s/p PCI, Bradycardia, CAD, CHF, HTN, and HLD who presents to [facility] for a higher level of care for Acute on Chronic SDH. The patient initially presented to [facility] on 04/01/18 s/p fall and head injury. CT head on the same day revealed SDH in high right parietal vertex without mass effect. His plavix and ASA were discontinued. On 4/12 he was sent for a repeat CT, by PCP, with a plan to restart plavix and ASA. The CT revealed new 2 cm left frontal parietal SDH with L to R mass effect of 7 mm. Pt denies new falls, change in LOC, vision changes, numbness/tingling, loss of strength, or gait changes.     Hospital Course:  Evaluated by neurosurgery who did not recommend surgical intervention. ASA re-started.     Past Medical History:   Diagnosis Date    Anticoagulant long-term use     Anxiety     Arthritis     Bradycardia     CHF (congestive heart failure)     Chronic combined systolic and diastolic heart failure 4/14/2018    EF 40-45%    Coronary artery disease     Coronary artery disease involving native coronary artery of native heart without angina pectoris 4/14/2018    Essential hypertension 4/13/2018    Generalized headaches     Hemorrhoids     Hypercholesterolemia     Hypertension     Kidney stone     MI (myocardial infarction)     Prostate cancer     Skin cancer     UTI (urinary tract infection)     Weakness        Past Surgical History:   Procedure Laterality Date    CARDIAC  CATHETERIZATION      WITH STENTS    CATARACT SX Bilateral     CHOLECYSTECTOMY      CORONARY STENT PLACEMENT      HEMORRHOID SURGERY      KNEE SX Right     FOR DAMAGED CARTILAGE       Review of patient's allergies indicates:   Allergen Reactions    Codeine     Cyclobenzaprine        No current facility-administered medications on file prior to encounter.      Current Outpatient Prescriptions on File Prior to Encounter   Medication Sig    aspirin (ECOTRIN) 81 MG EC tablet Take 81 mg by mouth once daily.    atorvastatin (LIPITOR) 40 MG tablet Take 20 mg by mouth once daily.     clopidogrel (PLAVIX) 75 mg tablet Take 75 mg by mouth once daily.    furosemide (LASIX) 20 MG tablet Take 20 mg by mouth once daily at 6am.    isosorbide mononitrate (IMDUR) 60 MG 24 hr tablet Take 60 mg by mouth once daily.    lorazepam (ATIVAN) 0.5 MG tablet Take 0.5 mg by mouth 2 (two) times daily as needed.    nitroGLYCERIN (NITROSTAT) 0.4 MG SL tablet Place 0.4 mg under the tongue every 5 (five) minutes as needed.    ranolazine (RANEXA) 1,000 mg Tb12 Take 1,000 mg by mouth 2 (two) times daily.    sertraline (ZOLOFT) 25 MG tablet Take 50 mg by mouth once daily.      Family History     Problem Relation (Age of Onset)    Heart disease Mother        Social History Main Topics    Smoking status: Never Smoker    Smokeless tobacco: Never Used    Alcohol use No    Drug use: No    Sexual activity: Not on file     Review of Systems   Constitutional: Negative for appetite change, chills, fever and unexpected weight change.   HENT: Negative for congestion, rhinorrhea, sinus pain, sneezing and sore throat.    Eyes: Negative for visual disturbance.   Respiratory: Negative for cough, shortness of breath and wheezing.    Cardiovascular: Negative for chest pain, palpitations and leg swelling.   Gastrointestinal: Negative for abdominal distention, abdominal pain, blood in stool, constipation, diarrhea, nausea and vomiting.    Genitourinary: Negative for discharge, dysuria and hematuria.   Skin: Negative for rash.   Neurological: Negative for dizziness, seizures, syncope and headaches.   Psychiatric/Behavioral: Negative for suicidal ideas.     Objective:     Vital Signs (Most Recent):  Temp: 97.5 °F (36.4 °C) (04/15/18 1212)  Pulse: 70 (04/15/18 1212)  Resp: 18 (04/15/18 1212)  BP: (!) 91/53 (04/15/18 1212)  SpO2: 95 % (04/15/18 1212) Vital Signs (24h Range):  Temp:  [97.5 °F (36.4 °C)-98.9 °F (37.2 °C)] 97.5 °F (36.4 °C)  Pulse:  [44-90] 70  Resp:  [16-33] 18  SpO2:  [92 %-97 %] 95 %  BP: ()/(53-68) 91/53     Weight: 76.9 kg (169 lb 8.5 oz)  Body mass index is 27.36 kg/m².    Physical Exam   Constitutional: He is oriented to person, place, and time. He appears well-developed and well-nourished.   HENT:   Head: Normocephalic and atraumatic.   Nose: Nose normal.   Mouth/Throat: Oropharynx is clear and moist. No oropharyngeal exudate.   Eyes: Pupils are equal, round, and reactive to light. Right eye exhibits no discharge. Left eye exhibits no discharge. No scleral icterus.   Neck: Normal range of motion. Neck supple. No JVD present.   Cardiovascular: Normal rate, regular rhythm, normal heart sounds and intact distal pulses.    Pulmonary/Chest: Effort normal and breath sounds normal. No respiratory distress. He has no wheezes. He exhibits no tenderness.   Abdominal: Soft. Bowel sounds are normal. He exhibits no distension. There is no tenderness. There is no guarding.   Musculoskeletal: Normal range of motion. He exhibits no edema, tenderness or deformity.   Able to move all his extremities   Lymphadenopathy:     He has no cervical adenopathy.   Neurological: He is alert and oriented to person, place, and time. No cranial nerve deficit or sensory deficit.   Oriented to person, place, but not to time.   Skin: Skin is warm and dry. No rash noted. No erythema.   Psychiatric: He has a normal mood and affect. His behavior is normal.  Judgment and thought content normal.         CRANIAL NERVES     CN III, IV, VI   Pupils are equal, round, and reactive to light.       Significant Labs: All pertinent labs within the past 24 hours have been reviewed.    Significant Imaging: I have reviewed and interpreted all pertinent imaging results/findings within the past 24 hours.    Assessment/Plan:      * Acute on chronic intracranial subdural hematoma    Transferred to [facility] for new bleed on Ct. Repeat CT head with stable midline shift   - neurosurgery signed off, no surgical intervention necessary   - cont asa   - plan is to never re-start plavix   - needs PT re-eval since pt is too weak to go home        Anemia    Stable   - monitor         Coronary artery disease involving native coronary artery of native heart without angina pectoris    - continue asa, isosorbide, ranolazine, statin   - unclear why patient is not on BB or ACE        Bradycardia    Resolved. Contributed to vasovagal response while having bowel movement   - cardiology signed off   - monitor        Midline shift of brain                VTE Risk Mitigation         Ordered     heparin (porcine) injection 5,000 Units  Every 8 hours     Route:  Subcutaneous        04/14/18 1144     Reason for no Mechanical VTE Prophylaxis  Once      04/13/18 0301     IP VTE HIGH RISK PATIENT  Once      04/13/18 0301              KITTY ARREOLA MD  [facility]  Pager: [phone]  Spectralink: [id number]   Cell: [phone]

## 2018-04-15 NOTE — PROGRESS NOTES
[facility]  Cardiology  Progress Note    Patient Name: Gurvinder Mckeon  MRN: [id number]  Admission Date: 4/12/2018  Hospital Length of Stay: 2 days  Code Status: Full Code   Attending Physician: Henrry Laird MD   Primary Care Physician: Santos Maddox MD  Expected Discharge Date:   Principal Problem:Acute on chronic intracranial subdural hematoma    Subjective:     Hospital Course:   No notes on file    Interval History: complains of some back pain and should pain and weakness. HR noted to be 44 on VS check last night-Tele reviewed and shows Sinus with bigeminy with rate in 70s-EKG correlates with tele.    Review of Systems   Unable to perform ROS: other     Objective:     Vital Signs (Most Recent):  Temp: 98.1 °F (36.7 °C) (04/15/18 0814)  Pulse: 90 (04/15/18 0814)  Resp: (!) 22 (04/15/18 0814)  BP: 130/63 (04/15/18 0814)  SpO2: (!) 94 % (04/15/18 0814) Vital Signs (24h Range):  Temp:  [98 °F (36.7 °C)-98.9 °F (37.2 °C)] 98.1 °F (36.7 °C)  Pulse:  [44-90] 90  Resp:  [16-53] 22  SpO2:  [92 %-97 %] 94 %  BP: ()/(53-68) 130/63     Weight: 76.9 kg (169 lb 8.5 oz)  Body mass index is 27.36 kg/m².     SpO2: (!) 94 %  O2 Device (Oxygen Therapy): nasal cannula      Intake/Output Summary (Last 24 hours) at 04/15/18 1110  Last data filed at 04/15/18 0300   Gross per 24 hour   Intake              240 ml   Output              875 ml   Net             -635 ml       Lines/Drains/Airways     Peripheral Intravenous Line                 Peripheral IV - Single Lumen 04/12/18 1733 Left Forearm 2 days         Peripheral IV - Single Lumen 04/13/18 0349 Right Antecubital 2 days                Physical Exam  General: alert, awake and oriented   Neck:no JVD   Lungs:  clear to auscultation bilaterally   Cardiovascular: Heart: regular rate and rhythm, S1, S2 normal, no murmur, click, rub or gallop.   Pulses-2+ radial, femoral  Extremities: no cyanosis or edema.   Abdomen/Rectal: Abdomen: soft,  non-tender non-distented; bowel sounds normal      Significant Labs:   CMP   Recent Labs  Lab 04/13/18  1802 04/14/18  0359 04/15/18  0612   NA  --  137 136   K 4.1 4.1 4.1   CL  --  108 106   CO2  --  22* 18*   GLU  --  82 96   BUN  --  11 17   CREATININE  --  0.9 1.1   CALCIUM  --  8.0* 8.2*   PROT  --  5.9* 6.1   ALBUMIN  --  2.8* 2.8*   BILITOT  --  0.8 1.3*   ALKPHOS  --  106 101   AST  --  25 23   ALT  --  16 14   ANIONGAP  --  7* 12   ESTGFRAFRICA  --  >60.0 >60.0   EGFRNONAA  --  >60.0 >60.0   , CBC   Recent Labs  Lab 04/14/18  0359 04/15/18  0612   WBC 8.37 7.62   HGB 12.6* 13.2*   HCT 37.1* 38.9*   * 128*   , INR   Recent Labs  Lab 04/14/18  0359 04/15/18  0612   INR 1.2 1.2    and Troponin No results for input(s): TROPONINI in the last 48 hours.    Significant Imaging: Echocardiogram:   2D echo with color flow doppler:   Results for orders placed or performed during the hospital encounter of 04/12/18   Echo doppler color flow   Result Value Ref Range    EF 40 (A) 55 - 65    Mitral Valve Regurgitation TRIVIAL     Diastolic Dysfunction Yes (A)     Aortic Valve Stenosis MILD (A)     Est. PA Systolic Pressure 47.61 (A)     Mitral Valve Mobility NORMAL     Tricuspid Valve Regurgitation TRIVIAL      Assessment and Plan:       Coronary artery disease involving native coronary artery of native heart without angina pectoris    Baby asa when cleared by neuro  No need for plavix this far out from stents  Risk factor modification with atorvastatin 40mg po qhs, adequate BP control        Bradycardia    No bradycardia on tele review  Episode yesterday Likely vagal in the setting Of having a BM  No indication for any further cardiac intervention of workup  Cardiology will sign off  Please re-consult as needed and thank you for allowing us to be a part of Mr Mckeon's care            VTE Risk Mitigation         Ordered     heparin (porcine) injection 5,000 Units  Every 8 hours     Route:  Subcutaneous         04/14/18 1144     Reason for no Mechanical VTE Prophylaxis  Once      04/13/18 0301     IP VTE HIGH RISK PATIENT  Once      04/13/18 0301          Kike Pruitt MD  Cardiology  [facility]

## 2018-04-15 NOTE — NURSING
Pt confused about situation. Tries to get out of bed on his own for bedside toileting, confused, has Hx of falls, and forgets to call for help despite bed alarm sounding. Pt now has tele-monitoring for safety. GILBERT

## 2018-04-15 NOTE — NURSING
Pt HR 44 from 88 w/ bigeminy & PVC's on telemonitoring. 12 lead EKG ordered by [facility] on call. KODITM

## 2018-04-15 NOTE — HPI
Mr. Mckeon is a 85 y.o. male with pmhx of TSDH (4/1/18), MI s/p PCI, Bradycardia, CAD, CHF, HTN, and HLD who presents to [facility] for a higher level of care for Acute on Chronic SDH. The patient initially presented to [facility] on 04/01/18 s/p fall and head injury. CT head on the same day revealed SDH in high right parietal vertex without mass effect. His plavix and ASA were discontinued. On 4/12 he was sent for a repeat CT, by PCP, with a plan to restart plavix and ASA. The CT revealed new 2 cm left frontal parietal SDH with L to R mass effect of 7 mm. Pt denies new falls, change in LOC, vision changes, numbness/tingling, loss of strength, or gait changes.

## 2018-04-15 NOTE — PLAN OF CARE
Problem: Patient Care Overview  Goal: Plan of Care Review  Outcome: Ongoing (interventions implemented as appropriate)  Patient sleep today. VSS no signs of distress. Complained of pain between the shoulders, administered PRN tylenol as needed. Monitored blood sugar. Will continue to monitor.

## 2018-04-16 PROBLEM — D64.9 ANEMIA: Status: ACTIVE | Noted: 2018-04-16

## 2018-04-16 LAB
ALBUMIN SERPL BCP-MCNC: 2.7 G/DL
ALP SERPL-CCNC: 101 U/L
ALT SERPL W/O P-5'-P-CCNC: 13 U/L
ANION GAP SERPL CALC-SCNC: 6 MMOL/L
ANION GAP SERPL CALC-SCNC: 9 MMOL/L
AST SERPL-CCNC: 19 U/L
BASOPHILS # BLD AUTO: 0.02 K/UL
BASOPHILS NFR BLD: 0.3 %
BILIRUB SERPL-MCNC: 0.8 MG/DL
BUN SERPL-MCNC: 24 MG/DL
BUN SERPL-MCNC: 26 MG/DL
CALCIUM SERPL-MCNC: 7.7 MG/DL
CALCIUM SERPL-MCNC: 8.3 MG/DL
CHLORIDE SERPL-SCNC: 106 MMOL/L
CHLORIDE SERPL-SCNC: 107 MMOL/L
CO2 SERPL-SCNC: 21 MMOL/L
CO2 SERPL-SCNC: 23 MMOL/L
CREAT SERPL-MCNC: 1.3 MG/DL
CREAT SERPL-MCNC: 1.6 MG/DL
DIFFERENTIAL METHOD: ABNORMAL
EOSINOPHIL # BLD AUTO: 0.4 K/UL
EOSINOPHIL NFR BLD: 5.5 %
ERYTHROCYTE [DISTWIDTH] IN BLOOD BY AUTOMATED COUNT: 13.8 %
EST. GFR  (AFRICAN AMERICAN): 44.7 ML/MIN/1.73 M^2
EST. GFR  (AFRICAN AMERICAN): 57.5 ML/MIN/1.73 M^2
EST. GFR  (NON AFRICAN AMERICAN): 38.7 ML/MIN/1.73 M^2
EST. GFR  (NON AFRICAN AMERICAN): 49.8 ML/MIN/1.73 M^2
GLUCOSE SERPL-MCNC: 83 MG/DL
GLUCOSE SERPL-MCNC: 94 MG/DL
HCT VFR BLD AUTO: 39.9 %
HGB BLD-MCNC: 13 G/DL
IMM GRANULOCYTES # BLD AUTO: 0.03 K/UL
IMM GRANULOCYTES NFR BLD AUTO: 0.4 %
INR PPP: 1.3
LYMPHOCYTES # BLD AUTO: 1.2 K/UL
LYMPHOCYTES NFR BLD: 17 %
MAGNESIUM SERPL-MCNC: 2.1 MG/DL
MCH RBC QN AUTO: 32.8 PG
MCHC RBC AUTO-ENTMCNC: 32.6 G/DL
MCV RBC AUTO: 101 FL
MONOCYTES # BLD AUTO: 0.7 K/UL
MONOCYTES NFR BLD: 10.5 %
NEUTROPHILS # BLD AUTO: 4.6 K/UL
NEUTROPHILS NFR BLD: 66.3 %
NRBC BLD-RTO: 0 /100 WBC
PHOSPHATE SERPL-MCNC: 3.6 MG/DL
PLATELET # BLD AUTO: 146 K/UL
PMV BLD AUTO: 11.1 FL
POCT GLUCOSE: 83 MG/DL (ref 70–110)
POCT GLUCOSE: 87 MG/DL (ref 70–110)
POCT GLUCOSE: 91 MG/DL (ref 70–110)
POCT GLUCOSE: 92 MG/DL (ref 70–110)
POTASSIUM SERPL-SCNC: 4.1 MMOL/L
POTASSIUM SERPL-SCNC: 4.4 MMOL/L
PROT SERPL-MCNC: 6 G/DL
PROTHROMBIN TIME: 12.8 SEC
RBC # BLD AUTO: 3.96 M/UL
SODIUM SERPL-SCNC: 136 MMOL/L
SODIUM SERPL-SCNC: 136 MMOL/L
TROPONIN I SERPL DL<=0.01 NG/ML-MCNC: 0.02 NG/ML
WBC # BLD AUTO: 6.87 K/UL

## 2018-04-16 PROCEDURE — 97530 THERAPEUTIC ACTIVITIES: CPT

## 2018-04-16 PROCEDURE — 63600175 PHARM REV CODE 636 W HCPCS: Performed by: STUDENT IN AN ORGANIZED HEALTH CARE EDUCATION/TRAINING PROGRAM

## 2018-04-16 PROCEDURE — 25000003 PHARM REV CODE 250: Performed by: STUDENT IN AN ORGANIZED HEALTH CARE EDUCATION/TRAINING PROGRAM

## 2018-04-16 PROCEDURE — 63600175 PHARM REV CODE 636 W HCPCS: Performed by: HOSPITALIST

## 2018-04-16 PROCEDURE — 25000003 PHARM REV CODE 250: Performed by: HOSPITALIST

## 2018-04-16 PROCEDURE — 25000003 PHARM REV CODE 250: Performed by: NURSE PRACTITIONER

## 2018-04-16 PROCEDURE — 92523 SPEECH SOUND LANG COMPREHEN: CPT

## 2018-04-16 PROCEDURE — 84484 ASSAY OF TROPONIN QUANT: CPT

## 2018-04-16 PROCEDURE — 25000003 PHARM REV CODE 250: Performed by: PSYCHIATRY & NEUROLOGY

## 2018-04-16 PROCEDURE — 85610 PROTHROMBIN TIME: CPT

## 2018-04-16 PROCEDURE — 92526 ORAL FUNCTION THERAPY: CPT

## 2018-04-16 PROCEDURE — 84100 ASSAY OF PHOSPHORUS: CPT

## 2018-04-16 PROCEDURE — 97116 GAIT TRAINING THERAPY: CPT

## 2018-04-16 PROCEDURE — 36415 COLL VENOUS BLD VENIPUNCTURE: CPT

## 2018-04-16 PROCEDURE — 83735 ASSAY OF MAGNESIUM: CPT

## 2018-04-16 PROCEDURE — 80048 BASIC METABOLIC PNL TOTAL CA: CPT

## 2018-04-16 PROCEDURE — 97535 SELF CARE MNGMENT TRAINING: CPT

## 2018-04-16 PROCEDURE — 86580 TB INTRADERMAL TEST: CPT | Performed by: HOSPITALIST

## 2018-04-16 PROCEDURE — 11000001 HC ACUTE MED/SURG PRIVATE ROOM

## 2018-04-16 PROCEDURE — 85025 COMPLETE CBC W/AUTO DIFF WBC: CPT

## 2018-04-16 PROCEDURE — 99232 SBSQ HOSP IP/OBS MODERATE 35: CPT | Mod: ,,, | Performed by: HOSPITALIST

## 2018-04-16 PROCEDURE — 80053 COMPREHEN METABOLIC PANEL: CPT

## 2018-04-16 RX ORDER — SODIUM CHLORIDE 9 MG/ML
INJECTION, SOLUTION INTRAVENOUS CONTINUOUS
Status: DISCONTINUED | OUTPATIENT
Start: 2018-04-16 | End: 2018-04-17

## 2018-04-16 RX ADMIN — SODIUM CHLORIDE: 0.9 INJECTION, SOLUTION INTRAVENOUS at 11:04

## 2018-04-16 RX ADMIN — SODIUM CHLORIDE: 0.9 INJECTION, SOLUTION INTRAVENOUS at 02:04

## 2018-04-16 RX ADMIN — RANOLAZINE 1000 MG: 1000 TABLET, FILM COATED, EXTENDED RELEASE ORAL at 09:04

## 2018-04-16 RX ADMIN — DICLOFENAC: 10 GEL TOPICAL at 09:04

## 2018-04-16 RX ADMIN — ATORVASTATIN CALCIUM 40 MG: 20 TABLET, FILM COATED ORAL at 09:04

## 2018-04-16 RX ADMIN — HEPARIN SODIUM 5000 UNITS: 5000 INJECTION, SOLUTION INTRAVENOUS; SUBCUTANEOUS at 05:04

## 2018-04-16 RX ADMIN — SENNOSIDES 8.6 MG: 8.6 TABLET, FILM COATED ORAL at 09:04

## 2018-04-16 RX ADMIN — HEPARIN SODIUM 5000 UNITS: 5000 INJECTION, SOLUTION INTRAVENOUS; SUBCUTANEOUS at 02:04

## 2018-04-16 RX ADMIN — ISOSORBIDE MONONITRATE 30 MG: 30 TABLET, EXTENDED RELEASE ORAL at 09:04

## 2018-04-16 RX ADMIN — ASPIRIN 81 MG CHEWABLE TABLET 81 MG: 81 TABLET CHEWABLE at 09:04

## 2018-04-16 RX ADMIN — ACETAMINOPHEN 650 MG: 325 TABLET ORAL at 09:04

## 2018-04-16 RX ADMIN — SODIUM CHLORIDE 500 ML: 0.9 INJECTION, SOLUTION INTRAVENOUS at 12:04

## 2018-04-16 RX ADMIN — HEPARIN SODIUM 5000 UNITS: 5000 INJECTION, SOLUTION INTRAVENOUS; SUBCUTANEOUS at 09:04

## 2018-04-16 RX ADMIN — Medication 5 UNITS: at 12:04

## 2018-04-16 NOTE — PT/OT/SLP PROGRESS
Occupational Therapy   Treatment    Name: Gurvinder Mckeon  MRN: [id number]  Admitting Diagnosis:  Acute on chronic intracranial subdural hematoma       Recommendations:     Discharge Recommendations: nursing facility, skilled  Discharge Equipment Recommendations:  wheelchair  Barriers to discharge:  Inaccessible home environment, Decreased caregiver support    Subjective     Communicated with: RN prior to session.  Pain/Comfort:  · Pain Rating 1: 0/10  · Pain Rating Post-Intervention 1: 0/10    Objective:     Patient found with: telemetry, oxygen, SCD; found resting in supine    General Precautions: Standard, aspiration, fall, hearing impaired   Orthopedic Precautions:N/A   Braces: N/A     Occupational Performance:    Bed Mobility:    · Patient completed Scooting/Bridging with stand by assistance  · Patient completed Supine to Sit with stand by assistance  · Patient completed Sit to Supine with stand by assistance     Functional Mobility/Transfers:  · Patient completed Toilet Transfer Stand Pivot technique with contact guard assistance with  bedside commode and rolling walker; verbal cues provided for safe technique; pt demo'd understanding    Activities of Daily Living:  · Toileting: contact guard assistance to urinate while seated on bedside commode; CGA provided for stability while managing clothing in standing   · LB dressing: pt required Mod A to don socks while seated EOB (pt able to doff socks with SBA); pt required assist to place socks over toes on both feet; pt able to manage socks over remainder of B feet but required increased time/rest breaks to complete task 2* SOB    Patient left supine with all lines intact, call button in reach, bed alarm on and spouse/family present present    [facility] 6 Click:  [facility] Total Score:  17  Education:    Assessment:     Gurvinder Mckeon is a 85 y.o. male with a medical diagnosis of Acute on chronic intracranial subdural hematoma.  He presents with good  participation in today's session.  Pt followed all commands and was receptive to safety education.  He demonstrated limited activity tolerance requiring frequent rest breaks for completion of tasks. Performance deficits affecting function are weakness, impaired endurance, impaired self care skills, impaired functional mobilty, gait instability, impaired balance, decreased safety awareness, impaired cardiopulmonary response to activity.      Rehab Prognosis:  good; patient would benefit from acute skilled OT services to address these deficits and reach maximum level of function.       Plan:     Patient to be seen 4 x/week to address the above listed problems via self-care/home management, therapeutic activities, therapeutic exercises, neuromuscular re-education  · Plan of Care Expires: 05/13/18  · Plan of Care Reviewed with: patient, spouse, family    This Plan of care has been discussed with the patient who was involved in its development and understands and is in agreement with the identified goals and treatment plan    GOALS:    Occupational Therapy Goals        Problem: Occupational Therapy Goal    Goal Priority Disciplines Outcome Interventions   Occupational Therapy Goal     OT, PT/OT Ongoing (interventions implemented as appropriate)    Description:  Goals to be met by: 14 days (4/26/18)    Patient will increase functional independence with ADLs by performing:    LE Dressing with Minimal Assistance.  Grooming while standing at sink including oral care, face washing, and hand washing with Supervision and one or fewer seated rest breaks.  Toileting from toilet with Supervision for hygiene and clothing management.   Toilet transfer to toilet with Supervision.  Functional mobility at household distance with Supervision using RW.                      Time Tracking:     OT Date of Treatment: 04/16/18  OT Start Time: 1449  OT Stop Time: 1515  OT Total Time (min): 26 min     Billable Minutes:Self Care/Home Management  15  Therapeutic Activity 11    Hector Pierce, LOTR  4/16/2018

## 2018-04-16 NOTE — PLAN OF CARE
Problem: Physical Therapy Goal  Goal: Physical Therapy Goal  Goals to be met by: 18    Patient will increase functional independence with mobility by performin. Supine to sit with supervision   2. Sit to supine with supervision   3. Sit to stand transfer with Supervision with appropriate AD.   4. Gait  x 75 feet with Stand-by Assistance using Rolling Walker.   5. Ascend/descend 3 stairs with left Handrails Contact Guard Assistance.   6. Stand for 3 minutes with Stand-by Assistance using Rolling Walker  7. Lower extremity exercise program x20 reps per handout, with assistance as needed     Outcome: Ongoing (interventions implemented as appropriate)  Pt progressing towards goals; continue current POC.     Sasha Guan, PT, DPT   2018  Pager: [phone]

## 2018-04-16 NOTE — PLAN OF CARE
9:58 AM  SW received notification that pt will need SNF. Called daughter in law, Samantha who stated their preferences are [facility] [place], [facility] [place]. Faxed referrals. Will f/u as needed.    Shanna Odonnell, HIGINIO   [facility]  Ext [phone]

## 2018-04-16 NOTE — PLAN OF CARE
Problem: SLP Goal  Goal: SLP Goal  Speech Language Pathology Goals  Goals expected to be met by 4/19    1. Pt will tolerate regular diet/thin liquids without overt s/s of aspiration  2. Pt will participate in speech language cognitive eval       Outcome: Ongoing (interventions implemented as appropriate)  Pt tolerating po trials at bedside.  Speech Language Cognitive evaluation initiated.  Cont ST per POC.    Slime Posada CCC-SLP  4/16/2018

## 2018-04-16 NOTE — PT/OT/SLP PROGRESS
"Physical Therapy Treatment    Patient Name:  Gurvinder Mckeon   MRN:  [id number]    Recommendations:     Discharge Recommendations:  nursing facility, skilled   Discharge Equipment Recommendations: wheelchair   Barriers to discharge: Decreased caregiver support    Assessment:     Gurvinder Mckeon is a 85 y.o. male admitted with a medical diagnosis of Acute on chronic intracranial subdural hematoma.  He presents with the following impairments/functional limitations:  weakness, impaired endurance, impaired functional mobilty, gait instability, impaired balance, impaired self care skills, impaired cognition, decreased safety awareness, impaired cardiopulmonary response to activity. Pt tolerated progression of gait training this visit; displays improvement in gait stability with use of RW. However, pt and wife (primary caregiver) display poor safety awareness/insight to disability 2/2 reports of patient previously using 2 canes simultaneously for gait with home, and reports of pt and wife "not knowing what to do" during emergencies. Pt would benefit from SNF to improve endurance, gait stability, and reduce fall risk; pt and wife may also require continued 24/7 assistance within long term care setting following SNF 2/2 concerns for safety within home environment.     Rehab Prognosis: good; patient would benefit from acute skilled PT services to address these deficits and reach maximum level of function.      Recent Surgery: * No surgery found *      Plan:     During this hospitalization, patient to be seen 4 x/week to address the above listed problems via gait training, therapeutic activities, therapeutic exercises, neuromuscular re-education  · Plan of Care Expires:  05/13/18   Plan of Care Reviewed with: patient, spouse    Subjective     Communicated with RN prior to session.  Patient found supine upon PT entry to room, agreeable to treatment.  Pt's wife at bedside. Pt and wife (primary caregiver) display " "poor safety awareness/insight to disability 2/2 reports of patient using 2 canes simultaneously for gait with home, and reports of wife having TIA previously and pt and wife "not knowing what to do" and waiting to call for help.     Chief Complaint: weakness   Patient comments/goals: no goals identified   Pain/Comfort:  · Pain Rating 1: 0/10  · Pain Rating Post-Intervention 1: 0/10    Patients cultural, spiritual, Mormon conflicts given the current situation: no conflicts    Objective:     Patient found with: telemetry, oxygen, SCD     General Precautions: Standard, aspiration, fall, hearing impaired   Orthopedic Precautions:N/A   Braces: N/A     Functional Mobility:       Bed Mobility     · Supine to sit: CGA with use of handrail   · Sit to supine: CGA with use of handrail       Transfers   · Sit <> Stand: from EOB x 4 trials with R     Gait  · Distance: 55 ft.   · Assistance level: RW and CGA-min A to maintain balnce and manage RW    · Gait Deviations: decreased step length, forward flexed posture, decreased sonny         Therapeutic Activities and Exercises:  Therapeutic activities aimed to:  - increase pt's independence, safety, and efficiency with bed mobility and functional transfers. See above for assistance levels. Pt educated on RW management during transfers; performed trial of 3 repetitions of sit<>stand from EOB with RW, able to perform with CGA, cueing required each trial for reinforcement of hand placement   - Therex for BLE strengthening and endurance: performed in sitting 1x10- ankle pumps, LAQ  - educate pt and wife on safety awareness, tips to reduce fall risk, and PT POC     Therapist facilitated progression of gait training to improve gait stability, endurance, and independence with functional ambulation. Pt required cueing for scanning environment for obstacles, managing RW, and increasing step length bilaterally.     Patient left HOB elevated with all lines intact, call button in reach, " bed alarm on, RN notified and wife present..      AM-PAC 6 CLICK MOBILITY  Turning over in bed (including adjusting bedclothes, sheets and blankets)?: 3  Sitting down on and standing up from a chair with arms (e.g., wheelchair, bedside commode, etc.): 3  Moving from lying on back to sitting on the side of the bed?: 3  Moving to and from a bed to a chair (including a wheelchair)?: 3  Need to walk in hospital room?: 3  Climbing 3-5 steps with a railing?: 2  Total Score: 17       GOALS:    Physical Therapy Goals        Problem: Physical Therapy Goal    Goal Priority Disciplines Outcome Goal Variances Interventions   Physical Therapy Goal     PT/OT, PT Ongoing (interventions implemented as appropriate)     Description:  Goals to be met by: 18    Patient will increase functional independence with mobility by performin. Supine to sit with supervision   2. Sit to supine with supervision   3. Sit to stand transfer with Supervision with appropriate AD.   4. Gait  x 75 feet with Stand-by Assistance using Rolling Walker.   5. Ascend/descend 3 stairs with left Handrails Contact Guard Assistance.   6. Stand for 3 minutes with Stand-by Assistance using Rolling Walker  7. Lower extremity exercise program x20 reps per handout, with assistance as needed                      Time Tracking:     PT Received On: 18  PT Start Time: 1103     PT Stop Time: 1130  PT Total Time (min): 27 min     Billable Minutes: Gait Training 15 min and Therapeutic Activity 12 min    Treatment Type: Treatment  PT/PTA: PT     PTA Visit Number: 0     Sasha Guan PT, DPT   2018  Pager: [phone]

## 2018-04-16 NOTE — PLAN OF CARE
2:14 PM  SW received voicemail from daughter in law stating they want [facility]. Received call from Karen with [facility] who stated they can take pt tomorrow.     Shanna Odonnell LMSW   [facility]  Ext [phone]

## 2018-04-16 NOTE — PLAN OF CARE
Problem: Occupational Therapy Goal  Goal: Occupational Therapy Goal  Goals to be met by: 14 days (4/26/18)    Patient will increase functional independence with ADLs by performing:    LE Dressing with Minimal Assistance.  Grooming while standing at sink including oral care, face washing, and hand washing with Supervision and one or fewer seated rest breaks.  Toileting from toilet with Supervision for hygiene and clothing management.   Toilet transfer to toilet with Supervision.  Functional mobility at household distance with Supervision using RW.     Outcome: Ongoing (interventions implemented as appropriate)  OT goals remain appropriate.  EVERTON Apodaca  4/16/2018

## 2018-04-16 NOTE — NURSING
Pt.rested all night ,no attempts to get out of bed on his own,indepentent with using urinal,verbally expresses needs and concern.free from pain,no confusion noted during one on one conversation.on going monitoring.

## 2018-04-16 NOTE — PLAN OF CARE
CM contacted by Karen of the [facility] the family has chosen the facility for post acute placement  Karen @ the [facility] can be reached @ [phone] and there is a bed available 4/17

## 2018-04-16 NOTE — PT/OT/SLP EVAL
"Speech Language Pathology Evaluation  Cognitive Communication    Patient Name:  Gurvinder Mckeon   MRN:  [id number]  Admitting Diagnosis: Acute on chronic intracranial subdural hematoma    Recommendations:     Recommendations:                General Recommendations:  Dysphagia therapy and Cognitive-linguistic therapy  Diet recommendations:  Regular, Thin   Aspiration Precautions: Standard aspiration precautions   General Precautions: Standard, aspiration, fall, hearing impaired  Communication strategies:  provide increased time to answer    History:     Past Medical History:   Diagnosis Date    Anticoagulant long-term use     Anxiety     Arthritis     Bradycardia     CHF (congestive heart failure)     Chronic combined systolic and diastolic heart failure 4/14/2018    EF 40-45%    Coronary artery disease     Coronary artery disease involving native coronary artery of native heart without angina pectoris 4/14/2018    Essential hypertension 4/13/2018    Generalized headaches     Hemorrhoids     Hypercholesterolemia     Hypertension     Kidney stone     MI (myocardial infarction)     Prostate cancer     Skin cancer     UTI (urinary tract infection)     Weakness        Past Surgical History:   Procedure Laterality Date    CARDIAC CATHETERIZATION      WITH STENTS    CATARACT SX Bilateral     CHOLECYSTECTOMY      CORONARY STENT PLACEMENT      HEMORRHOID SURGERY      KNEE SX Right     FOR DAMAGED CARTILAGE       Social History: Patient lives with his wife.    Prior Intubation HX:  None this admission    Modified Barium Swallow: none this admission    Chest X-Rays: 4/13/18:  Mild peribronchial cuffing which could represent a low-grade airway infectious/inflammatory process.    Cardiomegaly.    Stable chronic lung changes.    Prior diet: regular per family report.    Occupation/hobbies/homemaking: Pt is retired. He enjoys watching tv.      Subjective     "He is doing better it seams.  He;s more " "himself."  Pt's daughter-in-law stated  Patient goals: none communicated     Pain/Comfort:  · Pain Rating 1: 0/10  · Pain Rating Post-Intervention 1: 0/10    Objective:   Cognitive Status:    Arousal/Alertness Appropriate response to stimuli  Attention No obvious deficits observed WFL  Orientation Person, Place and Situation  Memory Immediate Recall 80% acc for digits and words given repetitions d/t hearing impairment.  Pt w/ dx of dementia per family and impaired short term memory at baseline  Problem Solving Categories 100%, Sequencing 100% and Solutions 100%      Receptive Language:   Comprehension:      WFL  Questions Simple yes/no 100%  Complex yes/no 100%  Commands  multistep basic commands 1005  Conversation WFL    Pragmatics:    WFL    Expressive Language:  Verbal:    WFL  Automatic Speech  Days of the week 1005  Initiation WFL  Repetition Sentences 100%  Naming Confrontation 100%, Convergent 100%, Divergent responsive 100% and Single word responsive naming 100%  Sentence formulation WFL  Conversational speech WFL      Motor Speech:  WFL    Voice:   WFL    Visual-Spatial:  DNT    Reading:   Unable to assess Pt w/ limited education and baseline illiteracy     Written Expression:   WFL    Treatment: Pt was seated upright w/ HOB elevated.  He was offered and accepted po trials of thin liquids by cup and straw.  He tolerated all trials well w/ no overt signs of aspiration.  Education was provided to pt and family re: SLP role, eval results, poc, and aspiration precautions.  They indicated good understanding and agreed w/ established poc.    Assessment:   Gurvinder Mckeon is a 85 y.o. male with an SLP diagnosis of Dysphagia and Cognitive-Linguistic Impairment.  He presents with good tolerance of current diet and ongoing need for further assessment of cognitive-linguistic skills.    Goals:    SLP Goals        Problem: SLP Goal    Goal Priority Disciplines Outcome   SLP Goal     SLP Ongoing (interventions " implemented as appropriate)   Description:  Speech Language Pathology Goals  Goals expected to be met by 4/19    1. Pt will tolerate regular diet/thin liquids without overt s/s of aspiration  2. Pt will participate in speech language cognitive eval                        Plan:   · Patient to be seen:  5 x/week   · Plan of Care expires:     · Plan of Care reviewed with:  patient, spouse, family   · SLP Follow-Up:  Yes       Discharge recommendations:  Discharge Facility/Level Of Care Needs: nursing facility, skilled   Barriers to Discharge:  None    Time Tracking:   SLP Treatment Date:   04/16/18  Speech Start Time:  1409  Speech Stop Time:  1433     Speech Total Time (min):  24 min    Billable Minutes: Eval 14  and Treatment Swallowing Dysfunction 10    Slime Posada CCC-SLP  04/16/2018

## 2018-04-16 NOTE — NURSING
"RN CHECKED VS @0800 AND PULSE WAS 47 VIA BINAMAP; RN RECHECKED PULSE MANUALLY AND COUNTED 40 BPM; PT IS ASYMPTOMATIC AND DOES NOT APPEAR IN ANY DISTRESS; NO SOB NOTED; PT IS ON 2L O2 VIA Nc; PT CONTINUES TO BE ON CONTINUOUS TELEMETRY MONITORIING; Dr. SPIVEY NOTIFIED AND STATED "PT RUNS LOW"; WILL CONTINUE TO MONITOR CLOSELY  "

## 2018-04-17 ENCOUNTER — PATIENT OUTREACH (OUTPATIENT)
Dept: ADMINISTRATIVE | Facility: CLINIC | Age: 83
End: 2018-04-17

## 2018-04-17 VITALS
OXYGEN SATURATION: 92 % | BODY MASS INDEX: 27.25 KG/M2 | HEART RATE: 73 BPM | TEMPERATURE: 99 F | RESPIRATION RATE: 24 BRPM | HEIGHT: 66 IN | WEIGHT: 169.56 LBS | DIASTOLIC BLOOD PRESSURE: 67 MMHG | SYSTOLIC BLOOD PRESSURE: 141 MMHG
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
## 2018-04-17 LAB
ALBUMIN SERPL BCP-MCNC: 2.5 G/DL
ALP SERPL-CCNC: 129 U/L
ALT SERPL W/O P-5'-P-CCNC: 13 U/L
ANION GAP SERPL CALC-SCNC: 4 MMOL/L
AST SERPL-CCNC: 21 U/L
BASOPHILS # BLD AUTO: 0.02 K/UL
BASOPHILS NFR BLD: 0.3 %
BILIRUB SERPL-MCNC: 0.7 MG/DL
BUN SERPL-MCNC: 7 MG/DL
CALCIUM SERPL-MCNC: 7.9 MG/DL
CHLORIDE SERPL-SCNC: 110 MMOL/L
CO2 SERPL-SCNC: 22 MMOL/L
CREAT SERPL-MCNC: 1.3 MG/DL
DIFFERENTIAL METHOD: ABNORMAL
EOSINOPHIL # BLD AUTO: 0.3 K/UL
EOSINOPHIL NFR BLD: 4.8 %
ERYTHROCYTE [DISTWIDTH] IN BLOOD BY AUTOMATED COUNT: 14.2 %
EST. GFR  (AFRICAN AMERICAN): 57.5 ML/MIN/1.73 M^2
EST. GFR  (NON AFRICAN AMERICAN): 49.8 ML/MIN/1.73 M^2
GLUCOSE SERPL-MCNC: 86 MG/DL
HCT VFR BLD AUTO: 36.9 %
HGB BLD-MCNC: 12.1 G/DL
IMM GRANULOCYTES # BLD AUTO: 0.01 K/UL
IMM GRANULOCYTES NFR BLD AUTO: 0.2 %
LYMPHOCYTES # BLD AUTO: 0.9 K/UL
LYMPHOCYTES NFR BLD: 15.6 %
MAGNESIUM SERPL-MCNC: 1.9 MG/DL
MCH RBC QN AUTO: 33.3 PG
MCHC RBC AUTO-ENTMCNC: 32.8 G/DL
MCV RBC AUTO: 102 FL
MONOCYTES # BLD AUTO: 0.6 K/UL
MONOCYTES NFR BLD: 10 %
NEUTROPHILS # BLD AUTO: 4 K/UL
NEUTROPHILS NFR BLD: 69.1 %
NRBC BLD-RTO: 0 /100 WBC
PHOSPHATE SERPL-MCNC: 3.3 MG/DL
PLATELET # BLD AUTO: 143 K/UL
PMV BLD AUTO: 11.4 FL
POCT GLUCOSE: 78 MG/DL (ref 70–110)
POCT GLUCOSE: 81 MG/DL (ref 70–110)
POCT GLUCOSE: 91 MG/DL (ref 70–110)
POTASSIUM SERPL-SCNC: 4.3 MMOL/L
PROT SERPL-MCNC: 5.6 G/DL
RBC # BLD AUTO: 3.63 M/UL
SODIUM SERPL-SCNC: 136 MMOL/L
WBC # BLD AUTO: 5.82 K/UL

## 2018-04-17 PROCEDURE — 36415 COLL VENOUS BLD VENIPUNCTURE: CPT

## 2018-04-17 PROCEDURE — 99238 HOSP IP/OBS DSCHRG MGMT 30/<: CPT | Mod: ,,, | Performed by: HOSPITALIST

## 2018-04-17 PROCEDURE — 25000003 PHARM REV CODE 250: Performed by: PSYCHIATRY & NEUROLOGY

## 2018-04-17 PROCEDURE — 25000003 PHARM REV CODE 250: Performed by: STUDENT IN AN ORGANIZED HEALTH CARE EDUCATION/TRAINING PROGRAM

## 2018-04-17 PROCEDURE — 63600175 PHARM REV CODE 636 W HCPCS: Performed by: STUDENT IN AN ORGANIZED HEALTH CARE EDUCATION/TRAINING PROGRAM

## 2018-04-17 PROCEDURE — 85025 COMPLETE CBC W/AUTO DIFF WBC: CPT

## 2018-04-17 PROCEDURE — 80053 COMPREHEN METABOLIC PANEL: CPT

## 2018-04-17 PROCEDURE — 25000003 PHARM REV CODE 250: Performed by: NURSE PRACTITIONER

## 2018-04-17 PROCEDURE — 83735 ASSAY OF MAGNESIUM: CPT

## 2018-04-17 PROCEDURE — 84100 ASSAY OF PHOSPHORUS: CPT

## 2018-04-17 RX ORDER — SENNOSIDES 8.6 MG/1
1 TABLET ORAL DAILY
Start: 2018-04-18 | End: 2018-04-17

## 2018-04-17 RX ORDER — SENNOSIDES 8.6 MG/1
1 TABLET ORAL DAILY PRN
Status: ON HOLD
Start: 2018-04-17 | End: 2019-03-19 | Stop reason: HOSPADM

## 2018-04-17 RX ADMIN — SENNOSIDES 8.6 MG: 8.6 TABLET, FILM COATED ORAL at 09:04

## 2018-04-17 RX ADMIN — ATORVASTATIN CALCIUM 40 MG: 20 TABLET, FILM COATED ORAL at 09:04

## 2018-04-17 RX ADMIN — ISOSORBIDE MONONITRATE 30 MG: 30 TABLET, EXTENDED RELEASE ORAL at 09:04

## 2018-04-17 RX ADMIN — HEPARIN SODIUM 5000 UNITS: 5000 INJECTION, SOLUTION INTRAVENOUS; SUBCUTANEOUS at 05:04

## 2018-04-17 RX ADMIN — RANOLAZINE 1000 MG: 1000 TABLET, FILM COATED, EXTENDED RELEASE ORAL at 09:04

## 2018-04-17 RX ADMIN — ACETAMINOPHEN 650 MG: 325 TABLET ORAL at 09:04

## 2018-04-17 RX ADMIN — ASPIRIN 81 MG CHEWABLE TABLET 81 MG: 81 TABLET CHEWABLE at 09:04

## 2018-04-17 NOTE — PROGRESS NOTES
[facility] Hospital Medicine  Progress Note    Patient Name: Gurvinder Mckeon  MRN: [id number]  Patient Class: IP- Inpatient   Admission Date: 4/12/2018  Length of Stay: 4 days  Attending Physician: Ron Fitzgerald MD  Primary Care Provider: Santos Maddox MD    [facility] Medicine Team: [facility] HOSP MED A Ron Fitzgerald MD    Subjective:     Principal Problem:Acute on chronic intracranial subdural hematoma    HPI:  Mr. Mckeon is a 85 y.o. male with pmhx of TSDH (4/1/18), MI s/p PCI, Bradycardia, CAD, CHF, HTN, and HLD who presents to [facility] for a higher level of care for Acute on Chronic SDH. The patient initially presented to [facility] on 04/01/18 s/p fall and head injury. CT head on the same day revealed SDH in high right parietal vertex without mass effect. His plavix and ASA were discontinued. On 4/12 he was sent for a repeat CT, by PCP, with a plan to restart plavix and ASA. The CT revealed new 2 cm left frontal parietal SDH with L to R mass effect of 7 mm. Pt denies new falls, change in LOC, vision changes, numbness/tingling, loss of strength, or gait changes.     Hospital Course:  Evaluated by neurosurgery who did not recommend surgical intervention. ASA re-started. Stop Plavix. Cardiology evaluated, ok with stopping Plavix. Asymptomatic bradycardia, per cardiology, patient had one episode of jessie into 40s right after having BM, likely vagal in the setting of a SDH with midline shift. No indication for any further cardiac intervention of workup. Transfer to SNF    Interval History: Pt has no complains. Transfer to SNF today.     Review of Systems   Unable to perform ROS: dementia     Objective:     Vital Signs (Most Recent):  Temp: 98.7 °F (37.1 °C) (04/17/18 1120)  Pulse: 73 (04/17/18 1120)  Resp: (!) 24 (04/17/18 1120)  BP: (!) 141/67 (04/17/18 1120)  SpO2: (!) 92 % (04/17/18 1120) Vital Signs (24h Range):  Temp:  [97.3 °F (36.3 °C)-98.7 °F (37.1 °C)] 98.7 °F (37.1  °C)  Pulse:  [39-97] 73  Resp:  [16-24] 24  SpO2:  [92 %-96 %] 92 %  BP: (112-145)/(56-67) 141/67     Weight: 76.9 kg (169 lb 8.5 oz)  Body mass index is 27.36 kg/m².     SpO2: (!) 92 %  O2 Device (Oxygen Therapy): nasal cannula      Intake/Output Summary (Last 24 hours) at 04/17/18 1216  Last data filed at 04/17/18 0541   Gross per 24 hour   Intake          3471.66 ml   Output             1125 ml   Net          2346.66 ml     Physical Exam  General: alert, awake and oriented   Eyes:PERRL.   Neck:no JVD   Lungs:  clear to auscultation bilaterally   Cardiovascular: Heart: regular rate and rhythm, S1, S2 normal, no murmur, click, rub or gallop.   Pulses-2+ radial, femoral  Extremities: no cyanosis or edema.   Abdomen/Rectal: Abdomen: soft, non-tender non-distented; bowel sounds normal      Significant Labs:   CMP     Recent Labs  Lab 04/16/18  0722 04/16/18  1439 04/17/18  0614    136 136   K 4.1 4.4 4.3    107 110   CO2 21* 23 22*   GLU 83 94 86   BUN 26* 24* 7*   CREATININE 1.6* 1.3 1.3   CALCIUM 8.3* 7.7* 7.9*   PROT 6.0  --  5.6*   ALBUMIN 2.7*  --  2.5*   BILITOT 0.8  --  0.7   ALKPHOS 101  --  129   AST 19  --  21   ALT 13  --  13   ANIONGAP 9 6* 4*   ESTGFRAFRICA 44.7* 57.5* 57.5*   EGFRNONAA 38.7* 49.8* 49.8*   , CBC     Recent Labs  Lab 04/16/18  0722 04/17/18  0614   WBC 6.87 5.82   HGB 13.0* 12.1*   HCT 39.9* 36.9*   * 143*   , INR     Recent Labs  Lab 04/16/18  0722   INR 1.3*    and Troponin     Recent Labs  Lab 04/15/18  1725 04/16/18  0722   TROPONINI 0.034* 0.019       Significant Imaging: Echocardiogram:   2D echo with color flow doppler:   Results for orders placed or performed during the hospital encounter of 04/12/18   Echo doppler color flow   Result Value Ref Range    EF 40 (A) 55 - 65    Mitral Valve Regurgitation TRIVIAL     Diastolic Dysfunction Yes (A)     Aortic Valve Stenosis MILD (A)     Est. PA Systolic Pressure 47.61 (A)     Mitral Valve Mobility NORMAL     Tricuspid  Valve Regurgitation TRIVIAL         Assessment/Plan:      * Acute on chronic intracranial subdural hematoma    Transferred to [facility] for new bleed on Ct. Repeat CT head with stable midline shift   - neurosurgery signed off, no surgical intervention necessary   - cont asa, plan is to never re-start plavix   - needs PT re-eval since pt is too weak to go home  - SNF placement        Fall    - stop Benzo due to fall risk.  - PT/OT  - stop plavix          Anemia    Stable   - monitor         Coronary artery disease involving native coronary artery of native heart without angina pectoris    - continue asa, isosorbide, ranolazine, statin   - unclear why patient is not on BB or ACE        Bradycardia    Resolved. Sinus with bigeminy with rate in 70s-EKG correlates with tele.  - Contributed to vasovagal response while having bowel movement   - cardiology signed off  - monitor        Midline shift of brain                VTE Risk Mitigation         Ordered     heparin (porcine) injection 5,000 Units  Every 8 hours     Route:  Subcutaneous        04/14/18 1144     Reason for no Mechanical VTE Prophylaxis  Once      04/13/18 0301     IP VTE HIGH RISK PATIENT  Once      04/13/18 0301        Discharge to SNF      Ron Fitzgerald MD  Department of Hospital Medicine   [facility]

## 2018-04-17 NOTE — ASSESSMENT & PLAN NOTE
Transferred to [facility] for new bleed on Ct. Repeat CT head with stable midline shift   - neurosurgery signed off, no surgical intervention necessary   - cont asa, plan is to never re-start plavix   - needs PT re-eval since pt is too weak to go home  - SNF placement

## 2018-04-17 NOTE — SUBJECTIVE & OBJECTIVE
Interval History: Pt has no complains. Transfer to SNF today.     Review of Systems   Unable to perform ROS: dementia     Objective:     Vital Signs (Most Recent):  Temp: 98.7 °F (37.1 °C) (04/17/18 1120)  Pulse: 73 (04/17/18 1120)  Resp: (!) 24 (04/17/18 1120)  BP: (!) 141/67 (04/17/18 1120)  SpO2: (!) 92 % (04/17/18 1120) Vital Signs (24h Range):  Temp:  [97.3 °F (36.3 °C)-98.7 °F (37.1 °C)] 98.7 °F (37.1 °C)  Pulse:  [39-97] 73  Resp:  [16-24] 24  SpO2:  [92 %-96 %] 92 %  BP: (112-145)/(56-67) 141/67     Weight: 76.9 kg (169 lb 8.5 oz)  Body mass index is 27.36 kg/m².     SpO2: (!) 92 %  O2 Device (Oxygen Therapy): nasal cannula      Intake/Output Summary (Last 24 hours) at 04/17/18 1216  Last data filed at 04/17/18 0541   Gross per 24 hour   Intake          3471.66 ml   Output             1125 ml   Net          2346.66 ml     Physical Exam  General: alert, awake and oriented   Eyes:PERRL.   Neck:no JVD   Lungs:  clear to auscultation bilaterally   Cardiovascular: Heart: regular rate and rhythm, S1, S2 normal, no murmur, click, rub or gallop.   Pulses-2+ radial, femoral  Extremities: no cyanosis or edema.   Abdomen/Rectal: Abdomen: soft, non-tender non-distented; bowel sounds normal      Significant Labs:   CMP     Recent Labs  Lab 04/16/18  0722 04/16/18  1439 04/17/18  0614    136 136   K 4.1 4.4 4.3    107 110   CO2 21* 23 22*   GLU 83 94 86   BUN 26* 24* 7*   CREATININE 1.6* 1.3 1.3   CALCIUM 8.3* 7.7* 7.9*   PROT 6.0  --  5.6*   ALBUMIN 2.7*  --  2.5*   BILITOT 0.8  --  0.7   ALKPHOS 101  --  129   AST 19  --  21   ALT 13  --  13   ANIONGAP 9 6* 4*   ESTGFRAFRICA 44.7* 57.5* 57.5*   EGFRNONAA 38.7* 49.8* 49.8*   , CBC     Recent Labs  Lab 04/16/18  0722 04/17/18  0614   WBC 6.87 5.82   HGB 13.0* 12.1*   HCT 39.9* 36.9*   * 143*   , INR     Recent Labs  Lab 04/16/18  0722   INR 1.3*    and Troponin     Recent Labs  Lab 04/15/18  1725 04/16/18  0722   TROPONINI 0.034* 0.019       Significant  Imaging: Echocardiogram:   2D echo with color flow doppler:   Results for orders placed or performed during the hospital encounter of 04/12/18   Echo doppler color flow   Result Value Ref Range    EF 40 (A) 55 - 65    Mitral Valve Regurgitation TRIVIAL     Diastolic Dysfunction Yes (A)     Aortic Valve Stenosis MILD (A)     Est. PA Systolic Pressure 47.61 (A)     Mitral Valve Mobility NORMAL     Tricuspid Valve Regurgitation TRIVIAL      Review of Systems   Unable to perform ROS: dementia       Physical Exam

## 2018-04-17 NOTE — PLAN OF CARE
Patient discharging to [facility] SNF, SW following.       04/17/18 0951   Final Note   Assessment Type Final Discharge Note   Discharge Disposition SNF   Hospital Follow Up  Appt(s) scheduled? Yes   Discharge plans and expectations educations in teach back method with documentation complete? Yes   Right Care Referral Info   Post Acute Recommendation SNF / Sub-Acute Rehab

## 2018-04-17 NOTE — DISCHARGE SUMMARY
[facility] Hospital Medicine  Discharge Summary      Patient Name: Gurvinder Mckeon  MRN: [id number]  Admission Date: 4/12/2018  Hospital Length of Stay: 4 days  Discharge Date and Time: 4/17/2018 12:05 PM  Attending Physician: Ron Fitzgerald MD   Discharging Provider: Ron Fitzgerald MD  Primary Care Provider: Santos Maddox MD  [facility] Medicine Team: [facility] MED A Ron Fitzgerald MD    HPI:   Mr. Mckeon is a 85 y.o. male with pmhx of TSDH (4/1/18), MI s/p PCI, Bradycardia, CAD, CHF, HTN, and HLD who presents to [facility] for a higher level of care for Acute on Chronic SDH. The patient initially presented to [facility] on 04/01/18 s/p fall and head injury. CT head on the same day revealed SDH in high right parietal vertex without mass effect. His plavix and ASA were discontinued. On 4/12 he was sent for a repeat CT, by PCP, with a plan to restart plavix and ASA. The CT revealed new 2 cm left frontal parietal SDH with L to R mass effect of 7 mm. Pt denies new falls, change in LOC, vision changes, numbness/tingling, loss of strength, or gait changes.     * No surgery found *      Hospital Course:   Evaluated by neurosurgery who did not recommend surgical intervention. ASA re-started. Stop Plavix. Cardiology evaluated, ok with stopping Plavix. Asymptomatic bradycardia, per cardiology, patient had one episode of jessie into 40s right after having BM, likely vagal in the setting of a SDH with midline shift. No indication for any further cardiac intervention of workup. HDS stable, no further work up. Family unable to care for pt at home. Transfer to SNF with acceptance today.     Consults:   Consults         Status Ordering Provider     Inpatient consult to Cardiology  Once     Provider:  (Not yet assigned)    Completed HILDA KUMAR     Inpatient consult to Neurosurgery  Once     Provider:  (Not yet assigned)    Completed PHILLIP TOM     Inpatient consult to  Neurosurgery  Once     Provider:  (Not yet assigned)    Acknowledged SHERRI VILLAGRAN     Inpatient consult to Registered Dietitian/Nutritionist  Once     Provider:  (Not yet assigned)    Completed SHERRI VILLAGRAN     Inpatient consult to Social Work/Case Management  Once     Provider:  (Not yet assigned)    Acknowledged SHERRI VILLAGRAN          Assessment & Plan  Acute on chronic intracranial subdural hematoma     Transferred to [facility] for new bleed on Ct. Repeat CT head with stable midline shift   - neurosurgery signed off, no surgical intervention necessary   - cont asa, plan is to never re-start plavix   - needs PT re-eval since pt is too weak to go home  - SNF placement          Fall     - stop Benzo due to fall risk.  - PT/OT  - stop plavix             Coronary artery disease involving native coronary artery of native heart without angina pectoris     - continue asa, isosorbide, ranolazine, statin   - unclear why patient is not on BB or ACE          Bradycardia     Resolved. Sinus with bigeminy with rate in 70s-EKG correlates with tele.  - Contributed to vasovagal response while having bowel movement   - cardiology signed off  - monitor         Final Active Diagnoses:    Diagnosis Date Noted POA    PRINCIPAL PROBLEM:  Acute on chronic intracranial subdural hematoma [I62.01, I62.03] 04/13/2018 Yes    Fall [W19.XXXA] 04/17/2018 Yes    Anemia [D64.9] 04/16/2018 Yes    Bradycardia [R00.1] 04/14/2018 Yes    Coronary artery disease involving native coronary artery of native heart without angina pectoris [I25.10] 04/14/2018 Yes      Problems Resolved During this Admission:    Diagnosis Date Noted Date Resolved POA    Chronic combined systolic and diastolic heart failure [I50.42] 04/14/2018 04/15/2018 Yes    Subdural hemorrhage [I62.00] 04/14/2018 04/15/2018 Yes    SAH (subarachnoid hemorrhage) [I60.9] 04/13/2018 04/15/2018 Yes    Essential hypertension [I10] 04/13/2018 04/15/2018 Yes    Mild protein-calorie  malnutrition [E44.1] 04/13/2018 04/15/2018 Yes    Diarrhea of presumed infectious origin [A09] 04/13/2018 04/14/2018 Yes       Discharged Condition: stable    Disposition: Skilled Nursing Facility    Follow Up:  Follow-up Information     Santos Maddox MD. Schedule an appointment as soon as possible for a visit in 1 month.    Specialty:  Internal Medicine  Why:  Office visit, Post Hospital Follow Up  Contact information:  [address]  [address]  [place] [address]  [phone]             [facility].    Specialties:  Nursing Home Agency, SNF Agency  Why:  Skilled Nursing  Contact information:  [address]  [place] [address]  [phone]                 Patient Instructions:     Diet Cardiac     Activity as tolerated     Notify your health care provider if you experience any of the following:  increased confusion or weakness         Significant Diagnostic Studies: Labs:   CMP   Recent Labs  Lab 04/16/18  0722 04/16/18  1439 04/17/18  0614    136 136   K 4.1 4.4 4.3    107 110   CO2 21* 23 22*   GLU 83 94 86   BUN 26* 24* 7*   CREATININE 1.6* 1.3 1.3   CALCIUM 8.3* 7.7* 7.9*   PROT 6.0  --  5.6*   ALBUMIN 2.7*  --  2.5*   BILITOT 0.8  --  0.7   ALKPHOS 101  --  129   AST 19  --  21   ALT 13  --  13   ANIONGAP 9 6* 4*   ESTGFRAFRICA 44.7* 57.5* 57.5*   EGFRNONAA 38.7* 49.8* 49.8*    and CBC   Recent Labs  Lab 04/16/18  0722 04/17/18  0614   WBC 6.87 5.82   HGB 13.0* 12.1*   HCT 39.9* 36.9*   * 143*       Pending Diagnostic Studies:     None         Medications:  Reconciled Home Medications:      Medication List      START taking these medications    senna 8.6 mg tablet  Commonly known as:  SENOKOT  Take 1 tablet by mouth daily as needed for Constipation.        CONTINUE taking these medications    aspirin 81 MG EC tablet  Commonly known as:  ECOTRIN  Take 81 mg by mouth once daily.     atorvastatin 40 MG tablet  Commonly known as:  LIPITOR  Take 20 mg by mouth once daily.     furosemide 20 MG  tablet  Commonly known as:  LASIX  Take 20 mg by mouth once daily at 6am.     isosorbide mononitrate 60 MG 24 hr tablet  Commonly known as:  IMDUR  Take 60 mg by mouth once daily.     nitroGLYCERIN 0.4 MG SL tablet  Commonly known as:  NITROSTAT  Place 0.4 mg under the tongue every 5 (five) minutes as needed.     ranolazine 1,000 mg Tb12  Commonly known as:  RANEXA  Take 1,000 mg by mouth 2 (two) times daily.     sertraline 25 MG tablet  Commonly known as:  ZOLOFT  Take 50 mg by mouth once daily.        STOP taking these medications    clopidogrel 75 mg tablet  Commonly known as:  PLAVIX     LORazepam 0.5 MG tablet  Commonly known as:  ATIVAN     NexIUM 24HR 22.3 mg Cpdr  Generic drug:  esomeprazole magnesium            Indwelling Lines/Drains at time of discharge:   Lines/Drains/Airways          No matching active lines, drains, or airways          Time spent on the discharge of patient: <30 minutes  Patient was seen and examined on the date of discharge and determined to be suitable for discharge.         Ron Fitzgerald MD  Department of Hospital Medicine  [facility]

## 2018-04-17 NOTE — PROGRESS NOTES
[facility] Hospital Medicine  Progress Note    Patient Name: Gurvinder Mckeon  MRN: [id number]  Patient Class: IP- Inpatient   Admission Date: 4/12/2018  Length of Stay: 3 days  Attending Physician: Henrry Laird MD  Primary Care Provider: Santos Maddox MD    [facility] Medicine Team: [facility] HOSP MED A Henrry Laird MD    Subjective:     Principal Problem:Acute on chronic intracranial subdural hematoma    HPI:  Mr. Mckeon is a 85 y.o. male with pmhx of TSDH (4/1/18), MI s/p PCI, Bradycardia, CAD, CHF, HTN, and HLD who presents to [facility] for a higher level of care for Acute on Chronic SDH. The patient initially presented to [facility] on 04/01/18 s/p fall and head injury. CT head on the same day revealed SDH in high right parietal vertex without mass effect. His plavix and ASA were discontinued. On 4/12 he was sent for a repeat CT, by PCP, with a plan to restart plavix and ASA. The CT revealed new 2 cm left frontal parietal SDH with L to R mass effect of 7 mm. Pt denies new falls, change in LOC, vision changes, numbness/tingling, loss of strength, or gait changes.     Hospital Course:  Evaluated by neurosurgery who did not recommend surgical intervention. ASA re-started.     No new subjective & objective note has been filed under this hospital service since the last note was generated.    Assessment/Plan:      * Acute on chronic intracranial subdural hematoma    Transferred to [facility] for new bleed on Ct. Repeat CT head with stable midline shift   - neurosurgery signed off, no surgical intervention necessary   - cont asa   - plan is to never re-start plavix   - awaiting SNF placement         Anemia    Stable   - monitor         Coronary artery disease involving native coronary artery of native heart without angina pectoris    - continue asa, isosorbide, ranolazine, statin   - unclear why patient is not on BB or ACE        Bradycardia    Resolved. Contributed to vasovagal  response while having bowel movement   - cardiology signed off   - monitor        SOLA    - fluid challenge with IVF            VTE Risk Mitigation         Ordered     heparin (porcine) injection 5,000 Units  Every 8 hours     Route:  Subcutaneous        04/14/18 1144     Reason for no Mechanical VTE Prophylaxis  Once      04/13/18 0301     IP VTE HIGH RISK PATIENT  Once      04/13/18 0301              KITTY ARREOLA MD  [facility] Medicine  Pager: [phone]  Spectralink: [phone]   Cell: [phone]

## 2018-04-17 NOTE — NURSING
Pt ready for discharge to SNF; daughter in law notified by this RN of pt discharge; pt is currently on 1L O2 via NC and MD notified; VS are as follows: 141/67, 24 Rr, 92% on 1L NC and 98.7 and HR 73; IV discontinued, no wounds present; report called to nurseJohn at [facility]

## 2018-04-17 NOTE — PLAN OF CARE
11:02 AM  ZEKE received notification from Karen with [facility] that nurse can call report to [phone] to John. Pt is going to room 155A.    Arranged transportation with [facility] as pt has membership with [facility]. [facility] will arrive within the hour.     Informed RN, Cyn.    Shanna Odonnell, LMSW [facility]  Ext [phone]

## 2018-04-17 NOTE — ASSESSMENT & PLAN NOTE
Resolved. Sinus with bigeminy with rate in 70s-EKG correlates with tele.  - Contributed to vasovagal response while having bowel movement   - cardiology signed off  - monitor

## 2018-04-17 NOTE — SUBJECTIVE & OBJECTIVE
Past Medical History:   Diagnosis Date    Anticoagulant long-term use     Anxiety     Arthritis     Bradycardia     CHF (congestive heart failure)     Chronic combined systolic and diastolic heart failure 4/14/2018    EF 40-45%    Coronary artery disease     Coronary artery disease involving native coronary artery of native heart without angina pectoris 4/14/2018    Essential hypertension 4/13/2018    Generalized headaches     Hemorrhoids     Hypercholesterolemia     Hypertension     Kidney stone     MI (myocardial infarction)     Prostate cancer     Skin cancer     UTI (urinary tract infection)     Weakness        Past Surgical History:   Procedure Laterality Date    CARDIAC CATHETERIZATION      WITH STENTS    CATARACT SX Bilateral     CHOLECYSTECTOMY      CORONARY STENT PLACEMENT      HEMORRHOID SURGERY      KNEE SX Right     FOR DAMAGED CARTILAGE       Review of patient's allergies indicates:   Allergen Reactions    Codeine     Cyclobenzaprine        No current facility-administered medications on file prior to encounter.      Current Outpatient Prescriptions on File Prior to Encounter   Medication Sig    aspirin (ECOTRIN) 81 MG EC tablet Take 81 mg by mouth once daily.    atorvastatin (LIPITOR) 40 MG tablet Take 20 mg by mouth once daily.     furosemide (LASIX) 20 MG tablet Take 20 mg by mouth once daily at 6am.    isosorbide mononitrate (IMDUR) 60 MG 24 hr tablet Take 60 mg by mouth once daily.    nitroGLYCERIN (NITROSTAT) 0.4 MG SL tablet Place 0.4 mg under the tongue every 5 (five) minutes as needed.    ranolazine (RANEXA) 1,000 mg Tb12 Take 1,000 mg by mouth 2 (two) times daily.    sertraline (ZOLOFT) 25 MG tablet Take 50 mg by mouth once daily.     [DISCONTINUED] clopidogrel (PLAVIX) 75 mg tablet Take 75 mg by mouth once daily.    [DISCONTINUED] lorazepam (ATIVAN) 0.5 MG tablet Take 0.5 mg by mouth 2 (two) times daily as needed.     Family History     Problem Relation  (Age of Onset)    Heart disease Mother        Social History Main Topics    Smoking status: Never Smoker    Smokeless tobacco: Never Used    Alcohol use No    Drug use: No    Sexual activity: Not on file     Review of Systems   Constitutional: Negative for appetite change, chills, fever and unexpected weight change.   HENT: Negative for congestion, rhinorrhea, sinus pain, sneezing and sore throat.    Eyes: Negative for visual disturbance.   Respiratory: Negative for cough, shortness of breath and wheezing.    Cardiovascular: Negative for chest pain, palpitations and leg swelling.   Gastrointestinal: Negative for abdominal distention, abdominal pain, blood in stool, constipation, diarrhea, nausea and vomiting.   Genitourinary: Negative for discharge, dysuria and hematuria.   Skin: Negative for rash.   Neurological: Negative for dizziness, seizures, syncope and headaches.   Psychiatric/Behavioral: Negative for suicidal ideas.     Objective:     Vital Signs (Most Recent):  Temp: 97.3 °F (36.3 °C) (04/17/18 0858)  Pulse: 97 (04/17/18 0858)  Resp: 18 (04/17/18 0858)  BP: (!) 145/67 (04/17/18 0858)  SpO2: 96 % (04/17/18 0858) Vital Signs (24h Range):  Temp:  [97.3 °F (36.3 °C)-98 °F (36.7 °C)] 97.3 °F (36.3 °C)  Pulse:  [37-98] 97  Resp:  [16-20] 18  SpO2:  [94 %-96 %] 96 %  BP: (112-145)/(54-67) 145/67     Weight: 76.9 kg (169 lb 8.5 oz)  Body mass index is 27.36 kg/m².    Physical Exam   Constitutional: He is oriented to person, place, and time. He appears well-developed and well-nourished.   HENT:   Head: Normocephalic and atraumatic.   Nose: Nose normal.   Mouth/Throat: Oropharynx is clear and moist. No oropharyngeal exudate.   Eyes: Pupils are equal, round, and reactive to light. Right eye exhibits no discharge. Left eye exhibits no discharge. No scleral icterus.   Neck: Normal range of motion. Neck supple. No JVD present.   Cardiovascular: Normal rate, regular rhythm, normal heart sounds and intact distal  pulses.    Pulmonary/Chest: Effort normal and breath sounds normal. No respiratory distress. He has no wheezes. He exhibits no tenderness.   Abdominal: Soft. Bowel sounds are normal. He exhibits no distension. There is no tenderness. There is no guarding.   Musculoskeletal: Normal range of motion. He exhibits no edema, tenderness or deformity.   Able to move all his extremities   Lymphadenopathy:     He has no cervical adenopathy.   Neurological: He is alert and oriented to person, place, and time. No cranial nerve deficit or sensory deficit.   Oriented to person, place, but not to time.   Skin: Skin is warm and dry. No rash noted. No erythema.   Psychiatric: He has a normal mood and affect. His behavior is normal. Judgment and thought content normal.         CRANIAL NERVES     CN III, IV, VI   Pupils are equal, round, and reactive to light.       Significant Labs: All pertinent labs within the past 24 hours have been reviewed.    Significant Imaging: I have reviewed and interpreted all pertinent imaging results/findings within the past 24 hours.

## 2018-04-17 NOTE — PLAN OF CARE
9:38 AM  SW received nursing home orders. Faxed orders to the [facility]. Family to sign paperwork at 2:00PM today. Spoke with Karen with [facility] who stated pt is good to be transferred today. Will await bed assignment.     Shanna Odonnell LMSW   [facility]  Ext [phone]

## 2018-04-17 NOTE — PLAN OF CARE
[facility]     Department of Hospital Medicine     [address] [place] [address]     [phone] after hours  [phone] fax       NURSING HOME ORDERS    04/17/2018    Admit to Nursing Home:    Skilled Bed                                       Diagnoses:  Active Hospital Problems    Diagnosis  POA    *Acute on chronic intracranial subdural hematoma [I62.01, I62.03]  Yes    Anemia [D64.9]  Yes    Bradycardia [R00.1]  Yes    Coronary artery disease involving native coronary artery of native heart without angina pectoris [I25.10]  Yes      Resolved Hospital Problems    Diagnosis Date Resolved POA    Chronic combined systolic and diastolic heart failure [I50.42] 04/15/2018 Yes     EF 40-45%      Subdural hemorrhage [I62.00] 04/15/2018 Yes    SAH (subarachnoid hemorrhage) [I60.9] 04/15/2018 Yes    Essential hypertension [I10] 04/15/2018 Yes    Mild protein-calorie malnutrition [E44.1] 04/15/2018 Yes    Diarrhea of presumed infectious origin [A09] 04/14/2018 Yes       Patient is homebound due to:  Acute on chronic intracranial subdural hematoma    Allergies:  Review of patient's allergies indicates:   Allergen Reactions    Codeine     Cyclobenzaprine        Vitals:      Every shift (Skilled Nursing patients)    Diet: Cardiac   Supplement:  1 can every three times a day with meals                         Type:  Boost Plus OS      Oxygen: 2L Nasal Canula, PRN to keep oxygen sat >92%.     Activities:   - Up in a chair each morning as tolerated   - Ambulate with assistance to bathroom   - May use walker or self-propelled wheelchair      LABS:  Per facility protocol   CMP, CBC each month for 3 months    Nursing Precautions:     - Aspiration precautions:             - Total assistance with meals            -  Upright 90 degrees befor during and after meals             -  Suction at bedside          - Fall precautions per nursing home protocol   - Seizure  precaution per nursing home protocol   - Decubitus precautions:        -  for positioning   - Pressure reducing foam mattress   - Turn patient every two hours. Use wedge pillows to anchor patient    CONSULTS:    Physical Therapy to evaluate and treat     Occupational Therapy to evaluate and treat      MISCELLANEOUS CARE:         Routine Skin for Bedridden Patients:  Apply moisture barrier cream to all    skin folds and wet areas in perineal area daily and after baths and                           all bowel movements.       Medications: Discontinue all previous medication orders, if any. See new list below.     Gurvinder Mckeon   [facility] Medication Instructions RIRI:[id number]    Printed on:04/17/18 5315   Medication Information                      aspirin (ECOTRIN) 81 MG EC tablet  Take 81 mg by mouth once daily.             atorvastatin (LIPITOR) 40 MG tablet  Take 20 mg by mouth once daily.              furosemide (LASIX) 20 MG tablet  Take 20 mg by mouth once daily at 6am.             isosorbide mononitrate (IMDUR) 60 MG 24 hr tablet  Take 60 mg by mouth once daily.             nitroGLYCERIN (NITROSTAT) 0.4 MG SL tablet  Place 0.4 mg under the tongue every 5 (five) minutes as needed.    Chest pain, unspecified. R07.9             ranolazine (RANEXA) 1,000 mg Tb12  Take 1,000 mg by mouth 2 (two) times daily.             senna (SENOKOT) 8.6 mg tablet  Take 1 tablet by mouth once daily as needed for Constipation.      Constipation, unspecified. K59.00              sertraline (ZOLOFT) 25 MG tablet  Take 50 mg by mouth once daily.                  Signing Physician     Ron Fitzgerald MD  Hospital Medicine Staff  Department of Hospital Medicine   [facility]  4/17/2018 9:34 AM

## 2018-04-19 NOTE — PT/OT/SLP DISCHARGE
Physical Therapy Discharge Summary    Name: Gurvinder Mckeon  MRN: [id number]   Principal Problem: Acute on chronic intracranial subdural hematoma     Patient Discharged from acute Physical Therapy on 18.  Please refer to prior PT noted date on 18 for functional status.     Assessment:     Patient has not met goals.    Objective:     GOALS:    Physical Therapy Goals        Problem: Physical Therapy Goal    Goal Priority Disciplines Outcome Goal Variances Interventions   Physical Therapy Goal     PT/OT, PT Ongoing (interventions implemented as appropriate)     Description:  Goals to be met by: 18    Patient will increase functional independence with mobility by performin. Supine to sit with supervision   2. Sit to supine with supervision   3. Sit to stand transfer with Supervision with appropriate AD.   4. Gait  x 75 feet with Stand-by Assistance using Rolling Walker.   5. Ascend/descend 3 stairs with left Handrails Contact Guard Assistance.   6. Stand for 3 minutes with Stand-by Assistance using Rolling Walker  7. Lower extremity exercise program x20 reps per handout, with assistance as needed                      Reasons for Discontinuation of Therapy Services  Transfer to alternate level of care.      Plan:     Patient Discharged to: Skilled Nursing Facility.    Sasha Guan, PT  2018

## 2018-04-24 PROBLEM — I49.3 FREQUENT UNIFOCAL PVCS: Status: ACTIVE | Noted: 2018-04-24

## 2018-07-24 NOTE — PT/OT/SLP DISCHARGE
Occupational Therapy Discharge Summary    Gurvinder Mckeon  MRN: [id number]   Principal Problem: Acute on chronic intracranial subdural hematoma      Patient Discharged from acute Occupational Therapy on 4/17/18.  Please refer to prior OT note dated 4/16/18 for functional status.    Assessment:      Patient appropriate for care in another setting.    Objective:     GOALS:    Occupational Therapy Goals        Problem: Occupational Therapy Goal    Goal Priority Disciplines Outcome Interventions   Occupational Therapy Goal     OT, PT/OT Ongoing (interventions implemented as appropriate)    Description:  Goals to be met by: 14 days (4/26/18)    Patient will increase functional independence with ADLs by performing:    LE Dressing with Minimal Assistance.  Grooming while standing at sink including oral care, face washing, and hand washing with Supervision and one or fewer seated rest breaks.  Toileting from toilet with Supervision for hygiene and clothing management.   Toilet transfer to toilet with Supervision.  Functional mobility at household distance with Supervision using RW.                      Reasons for Discontinuation of Therapy Services  Transfer to alternate level of care.      Plan:     Patient Discharged to: Skilled Nursing Facility    EVERTON Apodaca  7/24/2018

## 2019-03-10 PROBLEM — R54 ADVANCED AGE: Status: ACTIVE | Noted: 2019-03-10

## 2019-03-10 PROBLEM — F39 MOOD DISORDER: Status: ACTIVE | Noted: 2019-03-10

## 2019-03-10 PROBLEM — T88.7XXA MEDICATION SIDE EFFECT: Status: ACTIVE | Noted: 2019-03-10

## 2019-03-10 PROBLEM — F69 BEHAVIOR CONCERN IN ADULT: Status: ACTIVE | Noted: 2019-03-10

## 2019-03-10 PROBLEM — F03.918 DEMENTIA WITH BEHAVIORAL DISTURBANCE: Status: ACTIVE | Noted: 2019-03-10

## 2019-03-10 PROBLEM — R53.2 FUNCTIONAL QUADRIPLEGIA: Status: ACTIVE | Noted: 2019-03-10

## 2019-03-11 PROBLEM — E63.9 INADEQUATE DIETARY ENERGY INTAKE: Status: ACTIVE | Noted: 2019-03-11

## 2019-03-14 PROBLEM — I25.10: Status: ACTIVE | Noted: 2019-03-14

## 2019-03-14 PROBLEM — R53.2 FUNCTIONAL QUADRIPLEGIA: Status: RESOLVED | Noted: 2019-03-10 | Resolved: 2019-03-14

## 2019-03-14 PROBLEM — T88.7XXA MEDICATION SIDE EFFECT: Status: RESOLVED | Noted: 2019-03-10 | Resolved: 2019-03-14

## 2020-02-08 ENCOUNTER — TELEPHONE (OUTPATIENT)
Dept: FAMILY MEDICINE | Facility: CLINIC | Age: 85
End: 2020-02-08

## 2020-02-08 RX ORDER — OXYCODONE HYDROCHLORIDE 5 MG/1
5 TABLET ORAL 2 TIMES DAILY
Qty: 28 TABLET | Refills: 0 | Status: SHIPPED | OUTPATIENT
Start: 2020-02-08

## 2020-02-08 RX ORDER — OXYCODONE HYDROCHLORIDE 10 MG/1
10 TABLET ORAL NIGHTLY
Qty: 14 TABLET | Refills: 0 | Status: SHIPPED | OUTPATIENT
Start: 2020-02-08